# Patient Record
Sex: FEMALE | Race: WHITE | NOT HISPANIC OR LATINO | Employment: OTHER | URBAN - METROPOLITAN AREA
[De-identification: names, ages, dates, MRNs, and addresses within clinical notes are randomized per-mention and may not be internally consistent; named-entity substitution may affect disease eponyms.]

---

## 2018-05-25 ENCOUNTER — TELEPHONE (OUTPATIENT)
Dept: NEUROSURGERY | Facility: CLINIC | Age: 83
End: 2018-05-25

## 2018-05-25 NOTE — TELEPHONE ENCOUNTER
----- Message from Oscar Marlow sent at 5/25/2018 10:45 AM CDT -----  Contact: Radha ( daughter ) @ [phone]  Caller is requesting a return call about the 5-28th appt, she has questions about surgery dates

## 2018-05-25 NOTE — TELEPHONE ENCOUNTER
FANY PICKARD, PTS DAUGHTER, REVIEWED AND ANSWERED QUESTIONS ABOUT MONDAYS APPT. WILL TALK Monday AM IF THE WEATHER IS TERRIBLY BAD, WE WILL RSL FOR ANTHER DAY, PROBABLY Thursday.

## 2018-05-28 ENCOUNTER — TELEPHONE (OUTPATIENT)
Dept: NEUROSURGERY | Facility: CLINIC | Age: 83
End: 2018-05-28

## 2018-05-28 ENCOUNTER — OFFICE VISIT (OUTPATIENT)
Dept: NEUROSURGERY | Facility: CLINIC | Age: 83
End: 2018-05-28
Payer: MEDICARE

## 2018-05-28 VITALS
SYSTOLIC BLOOD PRESSURE: 136 MMHG | DIASTOLIC BLOOD PRESSURE: 84 MMHG | TEMPERATURE: 98 F | WEIGHT: 153.69 LBS | HEART RATE: 75 BPM

## 2018-05-28 DIAGNOSIS — D35.2 PITUITARY ADENOMA: Primary | ICD-10-CM

## 2018-05-28 DIAGNOSIS — D49.7 PITUITARY TUMOR: Primary | ICD-10-CM

## 2018-05-28 DIAGNOSIS — G93.89 OTHER SPECIFIED DISORDERS OF BRAIN: ICD-10-CM

## 2018-05-28 PROCEDURE — 99999 PR PBB SHADOW E&M-EST. PATIENT-LVL III: CPT | Mod: PBBFAC,,, | Performed by: NEUROLOGICAL SURGERY

## 2018-05-28 PROCEDURE — 99213 OFFICE O/P EST LOW 20 MIN: CPT | Mod: PBBFAC | Performed by: NEUROLOGICAL SURGERY

## 2018-05-28 PROCEDURE — 99204 OFFICE O/P NEW MOD 45 MIN: CPT | Mod: S$PBB,,, | Performed by: NEUROLOGICAL SURGERY

## 2018-05-28 RX ORDER — OMEPRAZOLE 20 MG/1
20 CAPSULE, DELAYED RELEASE ORAL DAILY
COMMUNITY
Start: 2018-03-29

## 2018-05-28 RX ORDER — CYANOCOBALAMIN (VITAMIN B-12) 2500 MCG
5000 TABLET, SUBLINGUAL SUBLINGUAL DAILY
Status: ON HOLD | COMMUNITY
End: 2018-06-15

## 2018-05-28 RX ORDER — LISINOPRIL 20 MG/1
20 TABLET ORAL DAILY
COMMUNITY
Start: 2018-03-06 | End: 2018-09-10

## 2018-05-28 RX ORDER — AMLODIPINE BESYLATE 5 MG/1
5 TABLET ORAL NIGHTLY
Status: ON HOLD | COMMUNITY
Start: 2018-03-06 | End: 2018-06-15 | Stop reason: HOSPADM

## 2018-05-28 RX ORDER — DULOXETIN HYDROCHLORIDE 30 MG/1
30 CAPSULE, DELAYED RELEASE ORAL DAILY
COMMUNITY
End: 2018-09-10

## 2018-05-28 NOTE — PROGRESS NOTES
This office note has been dictated.  Florence Everett was seen in neurosurgical consultation at the office this   morning.  She is an 83-year-old lady who has complained of tunnel vision and   decreased vision in the left eye for at least the past year.  She apparently had   an attack of herpes zoster (shingles) a few years ago and feels that the vision   has never been as good in that eye.  In March, she was carrying packages and   fell forward striking her face.  She was not unconscious, but suffered fairly   extensive facial injuries.  A CT scan of the brain was done at that time showing   a pituitary tumor.  MRI was then done showing a large pituitary tumor with   considerable suprasellar extension.  She was seen in neurosurgical consultation   at [facility] in [place] and also in Radiation Oncology for   consideration of stereotactic radiosurgery.  She wished to have an additional   neurosurgical opinion and was seen here today.  Headaches are not her primary   complaint.  She does require a hearing aid.  She has noted no specific   difficulty with speech or swallowing.  She has had no weakness or numbness in   the extremities.  Her balance difficulties seem to relate more to her visual   loss.  Past medical history includes hypertension for which she takes Norvasc.    She is generally healthy.  Her  is quite ill with congestive heart   failure and much of her time is spent caring for him.  She is with her children   today.    On physical examination, she is a well-developed, well-nourished white lady who   is alert and cooperative.  Examination of the head shows no tenderness over the   scalp.  Eyes show full extraocular movements.  Pupils are small, equal and   reactive to light.  Fundi were seen with some difficulty.  There seems to be   left optic pallor.  She shows bitemporal hemianopsia and vision is decreased to   barely finger counting in the left eye.  She has a hearing aid.  The neck is    supple.  On neurological examination, speech is clear.  She answers questions   appropriately.  She is appropriately concerned about her visual loss and   impairment.  Finger-to-nose shows mild tremor bilaterally.  Gait was done   reasonably well.  Cranial nerve examination is otherwise intact.  She has normal   facial sensation and movement.  The tongue protrudes in the midline.  She shows   good strength in the extremities, normal sensation and symmetrical deep tendon   reflexes.    MRI of the brain was done at [facility] in [place] on   04/05/18.  There is a large lobulated pituitary tumor, which extends on to the   planum sphenoidale and up to the third ventricle.  The right optic nerve is   displaced laterally.  The left optic nerve is not well seen. The tumor was   measured as greater than 3 cm.    IMPRESSION:  Large pituitary adenoma.    RECOMMENDATIONS:  The tumor is severely compressing the optic nerves and chiasm.    I do not believe radiation therapy would be beneficial in this setting. With   the shape and size of the tumor,  I believe this would be better approached   through a left frontotemporal craniotomy giving direct visualization of the   optic nerves and vessels.  I will plan to admit her to the hospital next week to   get this done.      CHEIKH/DEMOND  dd: 05/28/2018 13:25:22 (CDT)  td: 05/29/2018 06:27:04 (CDT)  Doc ID   #[id number]  Job ID #[id number]    CC: Florence Everett

## 2018-06-01 ENCOUNTER — TELEPHONE (OUTPATIENT)
Dept: NEUROSURGERY | Facility: CLINIC | Age: 83
End: 2018-06-01

## 2018-06-01 NOTE — TELEPHONE ENCOUNTER
----- Message from Jeanie Escamilla sent at 6/1/2018  9:35 AM CDT -----  Contact: duarte Patel [phone]  daughter Radha Patel  is  would like to be called back from SHALONDA Veliz about mother surgery      can be reached at [phone]

## 2018-06-01 NOTE — TELEPHONE ENCOUNTER
----- Message from Femi Correia sent at 6/1/2018  4:29 PM CDT -----  Contact: Pt Daughter Radha  Pt Daughter Radha is requesting to call back.    Radha can be reached on her cell [phone] or home [phone].    Thank you!

## 2018-06-04 NOTE — PRE-PROCEDURE INSTRUCTIONS
Preop instructions: NPO after midnight, shower instructions, directions, leave all valuables at home, medication instructions for PM prior & am of procedure explained. Patient stated an understanding.     Patient denies any side effects or issues with anesthesia or sedation.

## 2018-06-05 ENCOUNTER — ANESTHESIA EVENT (OUTPATIENT)
Dept: SURGERY | Facility: HOSPITAL | Age: 83
DRG: 614 | End: 2018-06-05
Payer: MEDICARE

## 2018-06-05 ENCOUNTER — HOSPITAL ENCOUNTER (OUTPATIENT)
Dept: RADIOLOGY | Facility: HOSPITAL | Age: 83
Discharge: HOME | DRG: 614 | End: 2018-06-05
Attending: NEUROLOGICAL SURGERY
Payer: MEDICARE

## 2018-06-05 DIAGNOSIS — D35.2 PITUITARY ADENOMA: ICD-10-CM

## 2018-06-05 LAB
CREAT SERPL-MCNC: 0.7 MG/DL (ref 0.5–1.4)
SAMPLE: NORMAL

## 2018-06-05 PROCEDURE — 70553 MRI BRAIN STEM W/O & W/DYE: CPT | Mod: TC

## 2018-06-05 PROCEDURE — 25500020 PHARM REV CODE 255: Performed by: NEUROLOGICAL SURGERY

## 2018-06-05 PROCEDURE — 70553 MRI BRAIN STEM W/O & W/DYE: CPT | Mod: 26,,, | Performed by: RADIOLOGY

## 2018-06-05 PROCEDURE — A9585 GADOBUTROL INJECTION: HCPCS | Performed by: NEUROLOGICAL SURGERY

## 2018-06-05 RX ORDER — GADOBUTROL 604.72 MG/ML
7 INJECTION INTRAVENOUS
Status: COMPLETED | OUTPATIENT
Start: 2018-06-05 | End: 2018-06-05

## 2018-06-05 RX ADMIN — GADOBUTROL 7 ML: 604.72 INJECTION INTRAVENOUS at 03:06

## 2018-06-05 NOTE — ANESTHESIA PREPROCEDURE EVALUATION
[facility]  Anesthesia Pre-Operative Evaluation         Patient Name: Florence Everett  YOB: 1935  MRN: [id number]    SUBJECTIVE:     Pre-operative evaluation for Procedure(s) (LRB):  CRANIOTOMY, USING FRAMELESS STEREOTAXY (STEALTH) (Left)     06/05/2018    Florence Everett is a 83 y.o. female w/ a significant PMHx of HTN, GERD, and pituitary tumor found after patient suffered from a fall and underwent a CT scan. MRI was then done which showed large pituitary tumor w/ extension into suprasellar region. On exam there is left optic pallor. She shows bitemporal hemianopsia and vision is decreased to barely finger counting in the left eye.    MRI of the brain was done at [facility] in [place] on 04/05/18. There is a large lobulated pituitary tumor, which extends on to the planum sphenoidale and up to the third ventricle. The right optic nerve is displaced laterally. The left optic nerve is not well seen. The tumor was measured as greater than 3 cm.    Patient now presents for the above procedure(s).      LDA: None documented.    Prev airway: None documented.    Drips: None documented.    There is no problem list on file for this patient.      Review of patient's allergies indicates:  No Known Allergies    No current facility-administered medications on file prior to encounter.      Current Outpatient Prescriptions on File Prior to Encounter   Medication Sig Dispense Refill    amLODIPine (NORVASC) 5 MG tablet Take 5 mg by mouth every evening.       biotin 5,000 mcg Subl Place 5,000 mg under the tongue once daily.       DULoxetine (CYMBALTA) 30 MG capsule Take 30 mg by mouth once daily.      lisinopril (PRINIVIL,ZESTRIL) 20 MG tablet Take 20 mg by mouth once daily.       omeprazole (PRILOSEC) 20 MG capsule Take 20 mg by mouth once daily.       aspirin (ASPIR-81 ORAL) Take 81 mg by mouth once daily.         Past Surgical History:   Procedure Laterality Date     HYSTERECTOMY         Social History     Social History    Marital status:      Spouse name: N/A    Number of children: N/A    Years of education: N/A     Occupational History    Not on file.     Social History Main Topics    Smoking status: Never Smoker    Smokeless tobacco: Never Used    Alcohol use No    Drug use: No    Sexual activity: Not Currently     Partners: Male     Other Topics Concern    Not on file     Social History Narrative    No narrative on file       OBJECTIVE:     Vital Signs Range (Last 24H):         Significant Labs:  No results found for: WBC, HGB, HCT, PLT, CHOL, TRIG, HDL, LDLDIRECT, ALT, AST, NA, K, CL, CREATININE, BUN, CO2, TSH, PSA, INR, GLUF, HGBA1C, MICROALBUR    Diagnostic Studies: No relevant studies.    EKG: No recent studies available.    2D ECHO:  No results found for this or any previous visit.       ASSESSMENT/PLAN:     Anesthesia Evaluation    I have reviewed the Patient Summary Reports.    I have reviewed the Nursing Notes.   I have reviewed the Medications.     Review of Systems  Anesthesia Hx:  No problems with previous Anesthesia  Denies Family Hx of Anesthesia complications.   Denies Personal Hx of Anesthesia complications.   Social:  Non-Smoker, No Alcohol Use    Hematology/Oncology:        Denies Current/Recent Cancer   EENT/Dental:   denies chronic allergic rhinitis   Cardiovascular:   Denies Hypertension.  Denies MI.  Denies CAD.    Denies CABG/stent.  Denies Dysrhythmias.             Pulmonary:   Denies COPD.  Denies Asthma.  Denies Recent URI.  Denies Sleep Apnea.    Renal/:   Denies Chronic Renal Disease.     Hepatic/GI:   Denies GERD. Denies Liver Disease.    Neurological:   Denies TIA. Denies CVA. Denies Seizures.  Brain Tumor Pituitary tumor w/ neurological deficits   Endocrine:   Denies Diabetes.    Psych:   Denies Psychiatric History.          Physical Exam  General:  Well nourished    Airway/Jaw/Neck:  Airway Findings: Mouth Opening: Normal  Tongue: Normal  General Airway Assessment: Adult  Mallampati: III  Improves to II with phonation.  TM Distance: Normal, at least 6 cm  Jaw/Neck Findings:  Neck ROM: Normal ROM      Dental:  Dental Findings: In tact   Chest/Lungs:  Chest/Lungs Findings: Clear to auscultation, Normal Respiratory Rate     Heart/Vascular:  Heart Findings: Rate: Normal  Rhythm: Regular Rhythm  Sounds: Normal     Abdomen:  Abdomen Findings:  Normal, Soft, Nontender     Musculoskeletal:  Musculoskeletal Findings: Normal   Skin:  Skin Findings: Normal    Mental Status:  Mental Status Findings:  Cooperative, Alert and Oriented         Anesthesia Plan  Type of Anesthesia, risks & benefits discussed:  Anesthesia Type:  general  Patient's Preference:   Intra-op Monitoring Plan: standard ASA monitors and arterial line  Intra-op Monitoring Plan Comments:   Post Op Pain Control Plan: multimodal analgesia, IV/PO Opioids PRN and per primary service following discharge from PACU  Post Op Pain Control Plan Comments:   Induction:   IV  Beta Blocker:  Patient is not currently on a Beta-Blocker (No further documentation required).       Informed Consent: Patient understands risks and agrees with Anesthesia plan.  Questions answered. Anesthesia consent signed with patient.  ASA Score: 3     Day of Surgery Review of History & Physical:    H&P update referred to the surgeon.         Ready For Surgery From Anesthesia Perspective.

## 2018-06-06 ENCOUNTER — ANESTHESIA (OUTPATIENT)
Dept: SURGERY | Facility: HOSPITAL | Age: 83
DRG: 614 | End: 2018-06-06
Payer: MEDICARE

## 2018-06-06 ENCOUNTER — HOSPITAL ENCOUNTER (INPATIENT)
Facility: HOSPITAL | Age: 83
LOS: 9 days | Discharge: HOME HEALTH | DRG: 614 | End: 2018-06-15
Attending: NEUROLOGICAL SURGERY | Admitting: NEUROLOGICAL SURGERY
Payer: MEDICARE

## 2018-06-06 DIAGNOSIS — E87.1 HYPONATREMIA: ICD-10-CM

## 2018-06-06 DIAGNOSIS — S06.5XAA SDH (SUBDURAL HEMATOMA): ICD-10-CM

## 2018-06-06 DIAGNOSIS — G93.40 ENCEPHALOPATHY ACUTE: ICD-10-CM

## 2018-06-06 DIAGNOSIS — H53.9 VISUAL DISTURBANCE: Primary | ICD-10-CM

## 2018-06-06 DIAGNOSIS — E23.6 PITUITARY MASS: ICD-10-CM

## 2018-06-06 DIAGNOSIS — G93.5 BRAIN COMPRESSION: ICD-10-CM

## 2018-06-06 DIAGNOSIS — I10 ESSENTIAL HYPERTENSION: ICD-10-CM

## 2018-06-06 DIAGNOSIS — I61.5 IVH (INTRAVENTRICULAR HEMORRHAGE): ICD-10-CM

## 2018-06-06 LAB
ABO + RH BLD: NORMAL
ANION GAP SERPL CALC-SCNC: 10 MMOL/L
APTT BLDCRRT: 21.9 SEC
BASOPHILS # BLD AUTO: 0.05 K/UL
BASOPHILS NFR BLD: 0.8 %
BLD GP AB SCN CELLS X3 SERPL QL: NORMAL
BUN SERPL-MCNC: 17 MG/DL
CALCIUM SERPL-MCNC: 9.3 MG/DL
CHLORIDE SERPL-SCNC: 105 MMOL/L
CO2 SERPL-SCNC: 24 MMOL/L
CREAT SERPL-MCNC: 0.8 MG/DL
DIFFERENTIAL METHOD: NORMAL
EOSINOPHIL # BLD AUTO: 0.1 K/UL
EOSINOPHIL NFR BLD: 2.3 %
ERYTHROCYTE [DISTWIDTH] IN BLOOD BY AUTOMATED COUNT: 14.1 %
EST. GFR  (AFRICAN AMERICAN): >60 ML/MIN/1.73 M^2
EST. GFR  (NON AFRICAN AMERICAN): >60 ML/MIN/1.73 M^2
GLUCOSE SERPL-MCNC: 118 MG/DL (ref 70–110)
GLUCOSE SERPL-MCNC: 127 MG/DL (ref 70–110)
GLUCOSE SERPL-MCNC: 98 MG/DL
HCO3 UR-SCNC: 20.9 MMOL/L (ref 24–28)
HCO3 UR-SCNC: 22 MMOL/L (ref 24–28)
HCT VFR BLD AUTO: 38.4 %
HCT VFR BLD CALC: 29 %PCV (ref 36–54)
HCT VFR BLD CALC: 31 %PCV (ref 36–54)
HGB BLD-MCNC: 12.7 G/DL
IMM GRANULOCYTES # BLD AUTO: 0.03 K/UL
IMM GRANULOCYTES NFR BLD AUTO: 0.5 %
INR PPP: 0.9
LYMPHOCYTES # BLD AUTO: 2.1 K/UL
LYMPHOCYTES NFR BLD: 33.9 %
MCH RBC QN AUTO: 30.7 PG
MCHC RBC AUTO-ENTMCNC: 33.1 G/DL
MCV RBC AUTO: 93 FL
MONOCYTES # BLD AUTO: 0.9 K/UL
MONOCYTES NFR BLD: 14.6 %
NEUTROPHILS # BLD AUTO: 2.9 K/UL
NEUTROPHILS NFR BLD: 47.9 %
NRBC BLD-RTO: 0 /100 WBC
PCO2 BLDA: 28.8 MMHG (ref 35–45)
PCO2 BLDA: 30.4 MMHG (ref 35–45)
PH SMN: 7.45 [PH] (ref 7.35–7.45)
PH SMN: 7.49 [PH] (ref 7.35–7.45)
PLATELET # BLD AUTO: 214 K/UL
PMV BLD AUTO: 11.5 FL
PO2 BLDA: 117 MMHG (ref 80–100)
PO2 BLDA: 120 MMHG (ref 80–100)
POC BE: -1 MMOL/L
POC BE: -3 MMOL/L
POC IONIZED CALCIUM: 1 MMOL/L (ref 1.06–1.42)
POC IONIZED CALCIUM: 1.05 MMOL/L (ref 1.06–1.42)
POC SATURATED O2: 99 % (ref 95–100)
POC SATURATED O2: 99 % (ref 95–100)
POC TCO2: 22 MMOL/L (ref 23–27)
POC TCO2: 23 MMOL/L (ref 23–27)
POTASSIUM BLD-SCNC: 3.4 MMOL/L (ref 3.5–5.1)
POTASSIUM BLD-SCNC: 3.6 MMOL/L (ref 3.5–5.1)
POTASSIUM SERPL-SCNC: 3.8 MMOL/L
PROTHROMBIN TIME: 9.6 SEC
RBC # BLD AUTO: 4.14 M/UL
SAMPLE: ABNORMAL
SAMPLE: ABNORMAL
SODIUM BLD-SCNC: 139 MMOL/L (ref 136–145)
SODIUM BLD-SCNC: 140 MMOL/L (ref 136–145)
SODIUM SERPL-SCNC: 139 MMOL/L
WBC # BLD AUTO: 6.04 K/UL

## 2018-06-06 PROCEDURE — 20000000 HC ICU ROOM

## 2018-06-06 PROCEDURE — 63600175 PHARM REV CODE 636 W HCPCS: Performed by: STUDENT IN AN ORGANIZED HEALTH CARE EDUCATION/TRAINING PROGRAM

## 2018-06-06 PROCEDURE — 0GB00ZZ EXCISION OF PITUITARY GLAND, OPEN APPROACH: ICD-10-PCS | Performed by: NEUROLOGICAL SURGERY

## 2018-06-06 PROCEDURE — C1751 CATH, INF, PER/CENT/MIDLINE: HCPCS | Performed by: STUDENT IN AN ORGANIZED HEALTH CARE EDUCATION/TRAINING PROGRAM

## 2018-06-06 PROCEDURE — 63600175 PHARM REV CODE 636 W HCPCS

## 2018-06-06 PROCEDURE — 36000713 HC OR TIME LEV V EA ADD 15 MIN: Performed by: NEUROLOGICAL SURGERY

## 2018-06-06 PROCEDURE — 37000009 HC ANESTHESIA EA ADD 15 MINS: Performed by: NEUROLOGICAL SURGERY

## 2018-06-06 PROCEDURE — 61510 CRNEC TREPH EXC BRN TUM STTL: CPT | Mod: GC,,, | Performed by: NEUROLOGICAL SURGERY

## 2018-06-06 PROCEDURE — 85730 THROMBOPLASTIN TIME PARTIAL: CPT

## 2018-06-06 PROCEDURE — 27200677 HC TRANSDUCER MONITOR KIT SINGLE: Performed by: STUDENT IN AN ORGANIZED HEALTH CARE EDUCATION/TRAINING PROGRAM

## 2018-06-06 PROCEDURE — 36000712 HC OR TIME LEV V 1ST 15 MIN: Performed by: NEUROLOGICAL SURGERY

## 2018-06-06 PROCEDURE — 86920 COMPATIBILITY TEST SPIN: CPT

## 2018-06-06 PROCEDURE — 63600175 PHARM REV CODE 636 W HCPCS: Performed by: NEUROLOGICAL SURGERY

## 2018-06-06 PROCEDURE — D9220A PRA ANESTHESIA: Mod: ,,, | Performed by: ANESTHESIOLOGY

## 2018-06-06 PROCEDURE — 27201423 OPTIME MED/SURG SUP & DEVICES STERILE SUPPLY: Performed by: NEUROLOGICAL SURGERY

## 2018-06-06 PROCEDURE — 27800903 OPTIME MED/SURG SUP & DEVICES OTHER IMPLANTS: Performed by: NEUROLOGICAL SURGERY

## 2018-06-06 PROCEDURE — 25000003 PHARM REV CODE 250: Performed by: STUDENT IN AN ORGANIZED HEALTH CARE EDUCATION/TRAINING PROGRAM

## 2018-06-06 PROCEDURE — 69990 MICROSURGERY ADD-ON: CPT | Mod: 59,,, | Performed by: NEUROLOGICAL SURGERY

## 2018-06-06 PROCEDURE — 71000039 HC RECOVERY, EACH ADD'L HOUR: Performed by: NEUROLOGICAL SURGERY

## 2018-06-06 PROCEDURE — 88331 PATH CONSLTJ SURG 1 BLK 1SPC: CPT | Mod: 26,,, | Performed by: PATHOLOGY

## 2018-06-06 PROCEDURE — 37000008 HC ANESTHESIA 1ST 15 MINUTES: Performed by: NEUROLOGICAL SURGERY

## 2018-06-06 PROCEDURE — 27000221 HC OXYGEN, UP TO 24 HOURS

## 2018-06-06 PROCEDURE — C1713 ANCHOR/SCREW BN/BN,TIS/BN: HCPCS | Performed by: NEUROLOGICAL SURGERY

## 2018-06-06 PROCEDURE — 71000033 HC RECOVERY, INTIAL HOUR: Performed by: NEUROLOGICAL SURGERY

## 2018-06-06 PROCEDURE — 85025 COMPLETE CBC W/AUTO DIFF WBC: CPT

## 2018-06-06 PROCEDURE — C1729 CATH, DRAINAGE: HCPCS | Performed by: NEUROLOGICAL SURGERY

## 2018-06-06 PROCEDURE — 88305 TISSUE EXAM BY PATHOLOGIST: CPT | Mod: 26,,, | Performed by: PATHOLOGY

## 2018-06-06 PROCEDURE — 86901 BLOOD TYPING SEROLOGIC RH(D): CPT

## 2018-06-06 PROCEDURE — 94761 N-INVAS EAR/PLS OXIMETRY MLT: CPT

## 2018-06-06 PROCEDURE — 25000003 PHARM REV CODE 250: Performed by: NEUROLOGICAL SURGERY

## 2018-06-06 PROCEDURE — 85610 PROTHROMBIN TIME: CPT

## 2018-06-06 PROCEDURE — 80048 BASIC METABOLIC PNL TOTAL CA: CPT

## 2018-06-06 PROCEDURE — 36620 INSERTION CATHETER ARTERY: CPT | Mod: 59,,, | Performed by: ANESTHESIOLOGY

## 2018-06-06 PROCEDURE — 61781 SCAN PROC CRANIAL INTRA: CPT | Mod: ,,, | Performed by: NEUROLOGICAL SURGERY

## 2018-06-06 PROCEDURE — 88305 TISSUE EXAM BY PATHOLOGIST: CPT | Performed by: PATHOLOGY

## 2018-06-06 PROCEDURE — 8E09XBZ COMPUTER ASSISTED PROCEDURE OF HEAD AND NECK REGION: ICD-10-PCS | Performed by: NEUROLOGICAL SURGERY

## 2018-06-06 DEVICE — IMPLANTABLE DEVICE: Type: IMPLANTABLE DEVICE | Site: CRANIAL | Status: FUNCTIONAL

## 2018-06-06 DEVICE — DURAFORM 2 X 2: Type: IMPLANTABLE DEVICE | Site: CRANIAL | Status: FUNCTIONAL

## 2018-06-06 DEVICE — PLATE BONE 2X2 HOLE SM BOX: Type: IMPLANTABLE DEVICE | Site: CRANIAL | Status: FUNCTIONAL

## 2018-06-06 RX ORDER — OXYCODONE HYDROCHLORIDE 5 MG/1
5 TABLET ORAL
Status: DISCONTINUED | OUTPATIENT
Start: 2018-06-06 | End: 2018-06-06 | Stop reason: HOSPADM

## 2018-06-06 RX ORDER — ONDANSETRON 2 MG/ML
4 INJECTION INTRAMUSCULAR; INTRAVENOUS DAILY PRN
Status: DISCONTINUED | OUTPATIENT
Start: 2018-06-06 | End: 2018-06-06 | Stop reason: HOSPADM

## 2018-06-06 RX ORDER — BACITRACIN 50000 [IU]/1
INJECTION, POWDER, FOR SOLUTION INTRAMUSCULAR
Status: DISCONTINUED | OUTPATIENT
Start: 2018-06-06 | End: 2018-06-06 | Stop reason: HOSPADM

## 2018-06-06 RX ORDER — HEPARIN SODIUM 5000 [USP'U]/ML
5000 INJECTION, SOLUTION INTRAVENOUS; SUBCUTANEOUS EVERY 8 HOURS
Status: DISCONTINUED | OUTPATIENT
Start: 2018-06-07 | End: 2018-06-07

## 2018-06-06 RX ORDER — ONDANSETRON 2 MG/ML
INJECTION INTRAMUSCULAR; INTRAVENOUS
Status: DISCONTINUED | OUTPATIENT
Start: 2018-06-06 | End: 2018-06-07

## 2018-06-06 RX ORDER — HYDRALAZINE HYDROCHLORIDE 20 MG/ML
20 INJECTION INTRAMUSCULAR; INTRAVENOUS EVERY 6 HOURS PRN
Status: DISCONTINUED | OUTPATIENT
Start: 2018-06-06 | End: 2018-06-15 | Stop reason: HOSPADM

## 2018-06-06 RX ORDER — MANNITOL 250 MG/ML
INJECTION, SOLUTION INTRAVENOUS
Status: DISCONTINUED | OUTPATIENT
Start: 2018-06-06 | End: 2018-06-07

## 2018-06-06 RX ORDER — NEOSTIGMINE METHYLSULFATE 1 MG/ML
INJECTION, SOLUTION INTRAVENOUS
Status: DISCONTINUED | OUTPATIENT
Start: 2018-06-06 | End: 2018-06-07

## 2018-06-06 RX ORDER — HYDRALAZINE HYDROCHLORIDE 20 MG/ML
INJECTION INTRAMUSCULAR; INTRAVENOUS
Status: COMPLETED
Start: 2018-06-06 | End: 2018-06-06

## 2018-06-06 RX ORDER — PROPOFOL 10 MG/ML
VIAL (ML) INTRAVENOUS
Status: DISCONTINUED | OUTPATIENT
Start: 2018-06-06 | End: 2018-06-07

## 2018-06-06 RX ORDER — ROCURONIUM BROMIDE 10 MG/ML
INJECTION, SOLUTION INTRAVENOUS
Status: DISCONTINUED | OUTPATIENT
Start: 2018-06-06 | End: 2018-06-07

## 2018-06-06 RX ORDER — AMLODIPINE BESYLATE 5 MG/1
5 TABLET ORAL NIGHTLY
Status: DISCONTINUED | OUTPATIENT
Start: 2018-06-06 | End: 2018-06-10

## 2018-06-06 RX ORDER — LIDOCAINE HCL/PF 100 MG/5ML
SYRINGE (ML) INTRAVENOUS
Status: DISCONTINUED | OUTPATIENT
Start: 2018-06-06 | End: 2018-06-07

## 2018-06-06 RX ORDER — MUPIROCIN 20 MG/G
1 OINTMENT TOPICAL
Status: DISCONTINUED | OUTPATIENT
Start: 2018-06-06 | End: 2018-06-06

## 2018-06-06 RX ORDER — LISINOPRIL 20 MG/1
20 TABLET ORAL DAILY
Status: DISCONTINUED | OUTPATIENT
Start: 2018-06-06 | End: 2018-06-15 | Stop reason: HOSPADM

## 2018-06-06 RX ORDER — LIDOCAINE HYDROCHLORIDE AND EPINEPHRINE 10; 10 MG/ML; UG/ML
INJECTION, SOLUTION INFILTRATION; PERINEURAL
Status: DISCONTINUED | OUTPATIENT
Start: 2018-06-06 | End: 2018-06-06 | Stop reason: HOSPADM

## 2018-06-06 RX ORDER — ACETAMINOPHEN 10 MG/ML
INJECTION, SOLUTION INTRAVENOUS
Status: DISCONTINUED | OUTPATIENT
Start: 2018-06-06 | End: 2018-06-07

## 2018-06-06 RX ORDER — DULOXETIN HYDROCHLORIDE 30 MG/1
30 CAPSULE, DELAYED RELEASE ORAL DAILY
Status: DISCONTINUED | OUTPATIENT
Start: 2018-06-06 | End: 2018-06-15 | Stop reason: HOSPADM

## 2018-06-06 RX ORDER — MIDAZOLAM HYDROCHLORIDE 1 MG/ML
INJECTION, SOLUTION INTRAMUSCULAR; INTRAVENOUS
Status: DISCONTINUED | OUTPATIENT
Start: 2018-06-06 | End: 2018-06-07

## 2018-06-06 RX ORDER — SODIUM CHLORIDE 9 MG/ML
INJECTION, SOLUTION INTRAVENOUS CONTINUOUS
Status: DISCONTINUED | OUTPATIENT
Start: 2018-06-06 | End: 2018-06-06

## 2018-06-06 RX ORDER — MUPIROCIN 20 MG/G
OINTMENT TOPICAL
Status: DISCONTINUED | OUTPATIENT
Start: 2018-06-06 | End: 2018-06-06

## 2018-06-06 RX ORDER — OXYCODONE AND ACETAMINOPHEN 5; 325 MG/1; MG/1
1 TABLET ORAL EVERY 4 HOURS PRN
Status: DISCONTINUED | OUTPATIENT
Start: 2018-06-06 | End: 2018-06-15 | Stop reason: HOSPADM

## 2018-06-06 RX ORDER — FENTANYL CITRATE 50 UG/ML
INJECTION, SOLUTION INTRAMUSCULAR; INTRAVENOUS
Status: DISCONTINUED | OUTPATIENT
Start: 2018-06-06 | End: 2018-06-07

## 2018-06-06 RX ORDER — KETAMINE HYDROCHLORIDE 100 MG/ML
INJECTION, SOLUTION INTRAMUSCULAR; INTRAVENOUS
Status: DISCONTINUED | OUTPATIENT
Start: 2018-06-06 | End: 2018-06-07

## 2018-06-06 RX ORDER — FENTANYL CITRATE 50 UG/ML
25 INJECTION, SOLUTION INTRAMUSCULAR; INTRAVENOUS
Status: DISCONTINUED | OUTPATIENT
Start: 2018-06-06 | End: 2018-06-10

## 2018-06-06 RX ORDER — PANTOPRAZOLE SODIUM 40 MG/1
40 TABLET, DELAYED RELEASE ORAL DAILY
Status: DISCONTINUED | OUTPATIENT
Start: 2018-06-06 | End: 2018-06-15 | Stop reason: HOSPADM

## 2018-06-06 RX ORDER — PHENYLEPHRINE HYDROCHLORIDE 10 MG/ML
INJECTION INTRAVENOUS
Status: DISCONTINUED | OUTPATIENT
Start: 2018-06-06 | End: 2018-06-07

## 2018-06-06 RX ORDER — GLYCOPYRROLATE 0.2 MG/ML
INJECTION INTRAMUSCULAR; INTRAVENOUS
Status: DISCONTINUED | OUTPATIENT
Start: 2018-06-06 | End: 2018-06-07

## 2018-06-06 RX ORDER — LIDOCAINE HYDROCHLORIDE 10 MG/ML
1 INJECTION, SOLUTION EPIDURAL; INFILTRATION; INTRACAUDAL; PERINEURAL ONCE
Status: COMPLETED | OUTPATIENT
Start: 2018-06-06 | End: 2018-06-06

## 2018-06-06 RX ORDER — SODIUM CHLORIDE 9 MG/ML
INJECTION, SOLUTION INTRAVENOUS CONTINUOUS PRN
Status: DISCONTINUED | OUTPATIENT
Start: 2018-06-06 | End: 2018-06-07

## 2018-06-06 RX ORDER — SODIUM CHLORIDE 0.9 % (FLUSH) 0.9 %
3 SYRINGE (ML) INJECTION
Status: DISCONTINUED | OUTPATIENT
Start: 2018-06-06 | End: 2018-06-06 | Stop reason: HOSPADM

## 2018-06-06 RX ORDER — LABETALOL HYDROCHLORIDE 5 MG/ML
10 INJECTION, SOLUTION INTRAVENOUS EVERY 10 MIN PRN
Status: DISCONTINUED | OUTPATIENT
Start: 2018-06-06 | End: 2018-06-15 | Stop reason: HOSPADM

## 2018-06-06 RX ADMIN — ACETAMINOPHEN 1000 MG: 10 INJECTION, SOLUTION INTRAVENOUS at 09:06

## 2018-06-06 RX ADMIN — ROCURONIUM BROMIDE 10 MG: 10 INJECTION, SOLUTION INTRAVENOUS at 09:06

## 2018-06-06 RX ADMIN — HYDROCORTISONE SODIUM SUCCINATE 100 MG: 100 INJECTION, POWDER, FOR SOLUTION INTRAMUSCULAR; INTRAVENOUS at 08:06

## 2018-06-06 RX ADMIN — HYDRALAZINE HYDROCHLORIDE 20 MG: 20 INJECTION INTRAMUSCULAR; INTRAVENOUS at 03:06

## 2018-06-06 RX ADMIN — MANNITOL 50 G: 250 INJECTION, SOLUTION INTRAVENOUS at 10:06

## 2018-06-06 RX ADMIN — FENTANYL CITRATE 50 MCG: 50 INJECTION, SOLUTION INTRAMUSCULAR; INTRAVENOUS at 12:06

## 2018-06-06 RX ADMIN — SODIUM CHLORIDE 1000 ML: 0.9 INJECTION, SOLUTION INTRAVENOUS at 07:06

## 2018-06-06 RX ADMIN — CEFTRIAXONE 2 G: 2 INJECTION, SOLUTION INTRAVENOUS at 08:06

## 2018-06-06 RX ADMIN — KETAMINE HYDROCHLORIDE 10 MG: 100 INJECTION, SOLUTION, CONCENTRATE INTRAMUSCULAR; INTRAVENOUS at 11:06

## 2018-06-06 RX ADMIN — PHENYLEPHRINE HYDROCHLORIDE 100 MCG: 10 INJECTION INTRAVENOUS at 12:06

## 2018-06-06 RX ADMIN — PANTOPRAZOLE SODIUM 40 MG: 40 TABLET, DELAYED RELEASE ORAL at 04:06

## 2018-06-06 RX ADMIN — PROPOFOL 30 MG: 10 INJECTION, EMULSION INTRAVENOUS at 08:06

## 2018-06-06 RX ADMIN — ROCURONIUM BROMIDE 40 MG: 10 INJECTION, SOLUTION INTRAVENOUS at 08:06

## 2018-06-06 RX ADMIN — SODIUM CHLORIDE: 0.9 INJECTION, SOLUTION INTRAVENOUS at 08:06

## 2018-06-06 RX ADMIN — HYDROCORTISONE SODIUM SUCCINATE 100 MG: 100 INJECTION, POWDER, FOR SOLUTION INTRAMUSCULAR; INTRAVENOUS at 11:06

## 2018-06-06 RX ADMIN — PHENYLEPHRINE HYDROCHLORIDE 100 MCG: 10 INJECTION INTRAVENOUS at 09:06

## 2018-06-06 RX ADMIN — CALCIUM CHLORIDE 250 MG: 100 INJECTION, SOLUTION INTRAVENOUS at 12:06

## 2018-06-06 RX ADMIN — PHENYLEPHRINE HYDROCHLORIDE 100 MCG: 10 INJECTION INTRAVENOUS at 10:06

## 2018-06-06 RX ADMIN — KETAMINE HYDROCHLORIDE 10 MG: 100 INJECTION, SOLUTION, CONCENTRATE INTRAMUSCULAR; INTRAVENOUS at 12:06

## 2018-06-06 RX ADMIN — AMLODIPINE BESYLATE 5 MG: 5 TABLET ORAL at 11:06

## 2018-06-06 RX ADMIN — PROPOFOL 150 MG: 10 INJECTION, EMULSION INTRAVENOUS at 08:06

## 2018-06-06 RX ADMIN — LIDOCAINE HYDROCHLORIDE 80 MG: 20 INJECTION, SOLUTION INTRAVENOUS at 08:06

## 2018-06-06 RX ADMIN — MUPIROCIN: 20 OINTMENT TOPICAL at 07:06

## 2018-06-06 RX ADMIN — SODIUM CHLORIDE, SODIUM GLUCONATE, SODIUM ACETATE, POTASSIUM CHLORIDE, MAGNESIUM CHLORIDE, SODIUM PHOSPHATE, DIBASIC, AND POTASSIUM PHOSPHATE: .53; .5; .37; .037; .03; .012; .00082 INJECTION, SOLUTION INTRAVENOUS at 10:06

## 2018-06-06 RX ADMIN — SODIUM CHLORIDE 0.15 MCG/KG/MIN: 9 INJECTION, SOLUTION INTRAVENOUS at 10:06

## 2018-06-06 RX ADMIN — MIDAZOLAM HYDROCHLORIDE 1 MG: 1 INJECTION, SOLUTION INTRAMUSCULAR; INTRAVENOUS at 08:06

## 2018-06-06 RX ADMIN — FENTANYL CITRATE 50 MCG: 50 INJECTION, SOLUTION INTRAMUSCULAR; INTRAVENOUS at 08:06

## 2018-06-06 RX ADMIN — OXYCODONE HYDROCHLORIDE AND ACETAMINOPHEN 1 TABLET: 5; 325 TABLET ORAL at 04:06

## 2018-06-06 RX ADMIN — ROCURONIUM BROMIDE 20 MG: 10 INJECTION, SOLUTION INTRAVENOUS at 12:06

## 2018-06-06 RX ADMIN — ROCURONIUM BROMIDE 10 MG: 10 INJECTION, SOLUTION INTRAVENOUS at 11:06

## 2018-06-06 RX ADMIN — GLYCOPYRROLATE 0.6 MG: 0.2 INJECTION, SOLUTION INTRAMUSCULAR; INTRAVENOUS at 01:06

## 2018-06-06 RX ADMIN — SODIUM CHLORIDE, SODIUM GLUCONATE, SODIUM ACETATE, POTASSIUM CHLORIDE, MAGNESIUM CHLORIDE, SODIUM PHOSPHATE, DIBASIC, AND POTASSIUM PHOSPHATE: .53; .5; .37; .037; .03; .012; .00082 INJECTION, SOLUTION INTRAVENOUS at 08:06

## 2018-06-06 RX ADMIN — PHENYLEPHRINE HYDROCHLORIDE 100 MCG: 10 INJECTION INTRAVENOUS at 11:06

## 2018-06-06 RX ADMIN — ONDANSETRON 4 MG: 2 INJECTION INTRAMUSCULAR; INTRAVENOUS at 01:06

## 2018-06-06 RX ADMIN — KETAMINE HYDROCHLORIDE 20 MG: 100 INJECTION, SOLUTION, CONCENTRATE INTRAMUSCULAR; INTRAVENOUS at 08:06

## 2018-06-06 RX ADMIN — LIDOCAINE HYDROCHLORIDE 0.2 MG: 10 INJECTION, SOLUTION EPIDURAL; INFILTRATION; INTRACAUDAL; PERINEURAL at 07:06

## 2018-06-06 RX ADMIN — EPHEDRINE SULFATE 10 MG: 50 INJECTION INTRAMUSCULAR; INTRAVENOUS; SUBCUTANEOUS at 09:06

## 2018-06-06 RX ADMIN — NEOSTIGMINE METHYLSULFATE 5 MG: 1 INJECTION INTRAVENOUS at 01:06

## 2018-06-06 RX ADMIN — ROCURONIUM BROMIDE 10 MG: 10 INJECTION, SOLUTION INTRAVENOUS at 10:06

## 2018-06-06 RX ADMIN — LISINOPRIL 20 MG: 20 TABLET ORAL at 04:06

## 2018-06-06 RX ADMIN — SODIUM CHLORIDE, SODIUM GLUCONATE, SODIUM ACETATE, POTASSIUM CHLORIDE, MAGNESIUM CHLORIDE, SODIUM PHOSPHATE, DIBASIC, AND POTASSIUM PHOSPHATE: .53; .5; .37; .037; .03; .012; .00082 INJECTION, SOLUTION INTRAVENOUS at 09:06

## 2018-06-06 NOTE — HPI
Patient is a 83 year old Female of HTN who is s/p left frontotemporal craniotomy with excision of pituitary tumor with neuronavigation and microsurgery. Patient presented to NSGY clinic 5/29/18 endorsing decreased vision in her left eye for the past year. In March, she suffered a fall without LOC and had extensive facial injuries. A CT of the brain was performed that showed a pituitary tumor. MRI brain then was performed that showed a large pituitary tumor with considerable suprasellar extension. On her physical exam at the time of clinic visit, she was noted to have full ocular movements, bitemporal hemianopsia and vision decreased in her left eye to barely counting. Decision was made for excision of the adenoma.   Patient is s/p excision of tumor 6/6/18. She will be admitted to [facility] for higher level of care.

## 2018-06-06 NOTE — PROGRESS NOTES
Notified Dr. Garrison with Neuro Critical Care (NCC) of patients arrival in PACU slot 2. Notified of current vitals (see flowsheet). Patient still slightly sedated and unable to participate in full neuro exam. Will continue to monitor and reassess.

## 2018-06-06 NOTE — H&P
[facility]  Neurocritical Care  History & Physical    Admit Date: 6/6/2018  Service Date: 06/06/2018  Length of Stay: 0    Subjective:     Chief Complaint: Pituitary mass    History of Present Illness: Patient is a 83 year old Female of HTN who is s/p left frontotemporal craniotomy with excision of pituitary tumor with neuronavigation and microsurgery. Patient presented to NS clinic 5/29/18 endorsing decreased vision in her left eye for the past year. In March, she suffered a fall without LOC and had extensive facial injuries. A CT of the brain was performed that showed a pituitary tumor. MRI brain then was performed that showed a large pituitary tumor with considerable suprasellar extension. On her physical exam at the time of clinic visit, she was noted to have full ocular movements, bitemporal hemianopsia and vision decreased in her left eye to barely counting. Decision was made for excision of the adenoma.   Patient is s/p excision of tumor 6/6/18. She will be admitted to [facility] for higher level of care.     Past Medical History:   Diagnosis Date    Cataract     Hyperlipidemia     Hypertension     Neuromuscular disorder      Past Surgical History:   Procedure Laterality Date    HYSTERECTOMY        No current facility-administered medications on file prior to encounter.      Current Outpatient Prescriptions on File Prior to Encounter   Medication Sig Dispense Refill    amLODIPine (NORVASC) 5 MG tablet Take 5 mg by mouth every evening.       aspirin (ASPIR-81 ORAL) Take 81 mg by mouth once daily.      biotin 5,000 mcg Subl Place 5,000 mg under the tongue once daily.       DULoxetine (CYMBALTA) 30 MG capsule Take 30 mg by mouth once daily.      lisinopril (PRINIVIL,ZESTRIL) 20 MG tablet Take 20 mg by mouth once daily.       omeprazole (PRILOSEC) 20 MG capsule Take 20 mg by mouth once daily.         Allergies: Patient has no known allergies.    History reviewed. No pertinent family  history.  Social History   Substance Use Topics    Smoking status: Never Smoker    Smokeless tobacco: Never Used    Alcohol use No     Review of Systems   Unable to perform ROS: Other   Unable to perform 2/2 patient's level of cooperation    Objective:     Vitals:    Temp: 97.7 °F (36.5 °C)  Pulse: 103  Rhythm: normal sinus rhythm  BP: 116/62  MAP (mmHg): 82  Resp: 19  SpO2: (!) 93 %  O2 Device (Oxygen Therapy): room air    Temp  Min: 97.2 °F (36.2 °C)  Max: 98 °F (36.7 °C)  Pulse  Min: 62  Max: 103  BP  Min: 109/60  Max: 183/92  MAP (mmHg)  Min: 77  Max: 130  Resp  Min: 14  Max: 21  SpO2  Min: 93 %  Max: 100 %    No intake/output data recorded.           Physical Exam   Constitutional: She appears well-developed and well-nourished.   HENT:   Drain in place   Cardiovascular: Normal rate.    Pulmonary/Chest: Effort normal.   Skin: Skin is warm and dry.   Nursing note and vitals reviewed.  Mental Status: Lethargic; opens eyes to verbal and tactile stimulation; not following commands  No gross facial asymmetry noted  Motor: Extremity strength difficult to fully assess secondary to mental status.  Patient withdrawing in BLEs to noxious stimuli  Coordination and gait unable to assess sec to mental status    Unable to test vision, orientation, memory, language, coordination and gait  due to level of consciousness.    Today I personally reviewed pertinent medications, imaging, lab results, notably:        Assessment/Plan:     Cardiac/Vascular   Essential hypertension    -SBP < 160  -Continue home meds of Lisinopril 20 mg and Amlodipine 5 mg daily         Endocrine   * Pituitary mass    -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  -Patient s/p left frontotemporal craniotomy, excision of pituitary tumor with neuronavigation and microsurgery  -Admitted to [facility]   -NSGY following   -Repeat MRI brain ordered per NSGY  -CT head ordered for 0400 6/7/18  -Will follow exam  -SBP <160                Prophylaxis:  Venous Thromboembolism: chemical  Stress Ulcer: PPI  Ventilator Pneumonia: not applicable     Activity Orders          Up with assistance starting at 06/07 0600        No Order    Vonda Ramírez MD  Neurocritical Care  [facility]

## 2018-06-06 NOTE — SUBJECTIVE & OBJECTIVE
Past Medical History:   Diagnosis Date    Cataract     Hyperlipidemia     Hypertension     Neuromuscular disorder      Past Surgical History:   Procedure Laterality Date    HYSTERECTOMY        No current facility-administered medications on file prior to encounter.      Current Outpatient Prescriptions on File Prior to Encounter   Medication Sig Dispense Refill    amLODIPine (NORVASC) 5 MG tablet Take 5 mg by mouth every evening.       aspirin (ASPIR-81 ORAL) Take 81 mg by mouth once daily.      biotin 5,000 mcg Subl Place 5,000 mg under the tongue once daily.       DULoxetine (CYMBALTA) 30 MG capsule Take 30 mg by mouth once daily.      lisinopril (PRINIVIL,ZESTRIL) 20 MG tablet Take 20 mg by mouth once daily.       omeprazole (PRILOSEC) 20 MG capsule Take 20 mg by mouth once daily.         Allergies: Patient has no known allergies.    History reviewed. No pertinent family history.  Social History   Substance Use Topics    Smoking status: Never Smoker    Smokeless tobacco: Never Used    Alcohol use No     Review of Systems   Unable to perform ROS: Other   Unable to perform 2/2 patient's level of cooperation    Objective:     Vitals:    Temp: 97.7 °F (36.5 °C)  Pulse: 103  Rhythm: normal sinus rhythm  BP: 116/62  MAP (mmHg): 82  Resp: 19  SpO2: (!) 93 %  O2 Device (Oxygen Therapy): room air    Temp  Min: 97.2 °F (36.2 °C)  Max: 98 °F (36.7 °C)  Pulse  Min: 62  Max: 103  BP  Min: 109/60  Max: 183/92  MAP (mmHg)  Min: 77  Max: 130  Resp  Min: 14  Max: 21  SpO2  Min: 93 %  Max: 100 %    No intake/output data recorded.           Physical Exam   Constitutional: She appears well-developed and well-nourished.   HENT:   Drain in place   Cardiovascular: Normal rate.    Pulmonary/Chest: Effort normal.   Skin: Skin is warm and dry.   Nursing note and vitals reviewed.  Mental Status: Lethargic; opens eyes to verbal and tactile stimulation; not following commands  No gross facial asymmetry noted  Motor: Extremity  strength difficult to fully assess secondary to mental status.  Patient withdrawing in BLEs to noxious stimuli  Coordination and gait unable to assess sec to mental status    Unable to test vision, orientation, memory, language, coordination and gait  due to level of consciousness.    Today I personally reviewed pertinent medications, imaging, lab results, notably:

## 2018-06-06 NOTE — HOSPITAL COURSE
6/6: s/p left frontotemporal craniotomy with excision of pituitary tumor with neuronavigation and microsurgery; admitted to [facility]   6/7: Follow up CT head with blood in ventricles; Follow up scan scheduled today  6/8: Patient more awake and alert than prior   6/10: SG drain pulled in AM. Vomited in afternoon, CTH stable   6/12: encephalopathic this am, sodium drop to 132mmHg, no obvious development of excessive diuresis           Urine studies pending, fluid restrict, EEG x 24 hrs  6/13: Exam improved. Na improved with fluid restriction. Pending step down to NSGY service. DVT ppx started,

## 2018-06-06 NOTE — H&P
[facility]  Neurosurgery  History & Physical    Patient Name: Florence Everett  MRN: [id number]  Admission Date: 6/6/2018  Attending Physician: Keo Shaffer MD   Primary Care Provider: Doc Moore MD    Patient information was obtained from patient and past medical records.     Subjective:     Chief Complaint/Reason for Admission: Surgery    History of Present Illness: Florence Everett is a 83 y.o. female w/ a significant PMHx of HTN, GERD, and pituitary tumor found after patient suffered from a fall and underwent a CT scan. MRI was then done which showed large pituitary tumor w/ extension into suprasellar region. On exam there is left optic pallor. She shows bitemporal hemianopsia and vision is decreased to barely finger counting in the left eye.     MRI of the brain was done at [facility] in [place] on 04/05/18. There is a large lobulated pituitary tumor, which extends on to the planum sphenoidale and up to the third ventricle. The right optic nerve is displaced laterally. The left optic nerve is not well seen. The tumor was measured as greater than 3 cm.     Patient now presents for surgery. No new complaints.    PTA Medications   Medication Sig    amLODIPine (NORVASC) 5 MG tablet Take 5 mg by mouth every evening.     aspirin (ASPIR-81 ORAL) Take 81 mg by mouth once daily.    biotin 5,000 mcg Subl Place 5,000 mg under the tongue once daily.     DULoxetine (CYMBALTA) 30 MG capsule Take 30 mg by mouth once daily.    lisinopril (PRINIVIL,ZESTRIL) 20 MG tablet Take 20 mg by mouth once daily.     omeprazole (PRILOSEC) 20 MG capsule Take 20 mg by mouth once daily.        Review of patient's allergies indicates:  No Known Allergies    Past Medical History:   Diagnosis Date    Cataract     Hyperlipidemia     Hypertension     Neuromuscular disorder      Past Surgical History:   Procedure Laterality Date    HYSTERECTOMY       Family History     None        Social  History Main Topics    Smoking status: Never Smoker    Smokeless tobacco: Never Used    Alcohol use No    Drug use: No    Sexual activity: Not Currently     Partners: Male     Review of Systems  Objective:     Weight: 66.7 kg (147 lb)  Body mass index is 26.89 kg/m².  Vital Signs (Most Recent):  Temp: 98 °F (36.7 °C) (06/06/18 0640)  Pulse: 80 (06/06/18 0640)  Resp: 14 (06/06/18 0640)  BP: (!) 158/82 (06/06/18 0640)  SpO2: 98 % (06/06/18 0640) Vital Signs (24h Range):  Temp:  [98 °F (36.7 °C)] 98 °F (36.7 °C)  Pulse:  [80] 80  Resp:  [14] 14  SpO2:  [98 %] 98 %  BP: (158)/(82) 158/82       Neurosurgery Physical Exam    General: well developed, well nourished, no distress.   Head: normocephalic, atraumatic  Neurologic: Alert and oriented. Thought content appropriate.  GCS: Motor: 6/Verbal: 5/Eyes: 4 GCS Total: 15  Mental Status: Awake, Alert, Oriented x 4  Language: No aphasia  Speech: No dysarthria  Cranial nerves: face symmetric, tongue midline, CN II-XII grossly intact.   Eyes: pupils equal, round, reactive to light with accomodation, EOMI.  Pulmonary: normal respirations, no signs of respiratory distress  Abdomen: soft, non-distended  Sensory: intact to light touch throughout  Motor Strength: Moves all extremities spontaneously with good tone.  Full strength upper and lower extremities. No abnormal movements seen.     Strength  Deltoids Triceps Biceps Wrist Extension Wrist Flexion Hand    Upper: R 5/5 5/5 5/5 5/5 5/5 5/5    L 5/5 5/5 5/5 5/5 5/5 5/5     Iliopsoas Quadriceps Knee  Flexion Tibialis  anterior Gastro- cnemius EHL   Lower: R 5/5 5/5 5/5 5/5 5/5 5/5    L 5/5 5/5 5/5 5/5 5/5 5/5     DTR's - 2 + and symmetric in UE and LE  Pronator Drift: no drift noted  Finger-to-nose: Intact bilaterally  Pulses: 2+ and symmetric radial and dorsalis pedis.    Significant Labs:  No results for input(s): GLU, NA, K, CL, CO2, BUN, CREATININE, CALCIUM, MG in the last 48 hours.  No results for input(s): WBC, HGB,  HCT, PLT in the last 48 hours.  No results for input(s): LABPT, INR, APTT in the last 48 hours.  Microbiology Results (last 7 days)     ** No results found for the last 168 hours. **        All pertinent labs from the last 24 hours have been reviewed.    Significant Diagnostics:  Reviewed    Assessment/Plan:     Active Diagnoses:    Diagnosis Date Noted POA    Pituitary mass [E23.7] 06/06/2018 Yes      Problems Resolved During this Admission:    Diagnosis Date Noted Date Resolved POA     -- To OR for surgery planned.     Álvaro Crespo MD  Neurosurgery  [facility]

## 2018-06-06 NOTE — ANESTHESIA PROCEDURE NOTES
Arterial    Diagnosis: Pituitary tumor  Doctor requesting consult: Edmund    Patient location during procedure: done in OR  Procedure start time: 6/6/2018 8:46 AM  Timeout: 6/6/2018 8:45 AM  Procedure end time: 6/6/2018 8:50 AM  Staffing  Anesthesiologist: ANALY ROGERS  Resident/CRNA: QUANG PEARCE  Performed: resident/CRNA   Anesthesiologist was present at the time of the procedure.  Preanesthetic Checklist  Completed: patient identified, surgical consent, pre-op evaluation, timeout performed, IV checked, risks and benefits discussed, monitors and equipment checked and anesthesia consent givenArterial  Skin Prep: chlorhexidine gluconate  Local Infiltration: lidocaine  Orientation: right  Location: radial  Catheter Size: 20 G  Catheter placement by Anatomical landmarks. Heme positive aspiration all ports.Insertion Attempts: 1  Assessment  Dressing: secured with tape and tegaderm  Patient: Tolerated well

## 2018-06-06 NOTE — ASSESSMENT & PLAN NOTE
-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  -Patient s/p left frontotemporal craniotomy, excision of pituitary tumor with neuronavigation and microsurgery  -Admitted to [facility]   -NSGY following   -Repeat MRI brain ordered per NSGY  -CT head ordered for 0400 6/7/18  -Will follow exam  -SBP <160

## 2018-06-06 NOTE — BRIEF OP NOTE
[facility]  Brief Operative Note    SUMMARY     Surgery Date: 6/6/2018     Surgeon(s) and Role:     * Keo Shaffer MD - Primary    Assisting Surgeon: REBEKAH Crespo MD Resident in neurosurgery    Pre-op Diagnosis:  Pituitary tumor [D49.7]    Post-op Diagnosis:  Post-Op Diagnosis Codes:     * Pituitary tumor [D49.7]    Procedure:  Left frontotemporal craniotomy, excision of pituitary tumor with neuronavigation and microsurgery    Anesthesia: General    Description of Procedure: Pterional transsylvian resection of pituitary adenoma.    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  Good decompression of optic nerves.      Estimated Blood Loss: *300 ml.         Specimens:   Specimen (12h ago through future)    Start     Ordered    06/06/18 1255  Specimen to Pathology - Surgery  Once     Comments:  1) Brain tumor--FROZEN---SENT2) Brain Tumor---PERM      06/06/18 7048

## 2018-06-07 LAB
ANION GAP SERPL CALC-SCNC: 7 MMOL/L
BACTERIA #/AREA URNS AUTO: ABNORMAL /HPF
BASOPHILS # BLD AUTO: 0.01 K/UL
BASOPHILS NFR BLD: 0.1 %
BILIRUB UR QL STRIP: NEGATIVE
BUN SERPL-MCNC: 9 MG/DL
CALCIUM SERPL-MCNC: 8.2 MG/DL
CHLORIDE SERPL-SCNC: 108 MMOL/L
CLARITY UR REFRACT.AUTO: CLEAR
CO2 SERPL-SCNC: 25 MMOL/L
COLOR UR AUTO: YELLOW
CREAT SERPL-MCNC: 0.7 MG/DL
DIFFERENTIAL METHOD: ABNORMAL
EOSINOPHIL # BLD AUTO: 0 K/UL
EOSINOPHIL NFR BLD: 0 %
ERYTHROCYTE [DISTWIDTH] IN BLOOD BY AUTOMATED COUNT: 14.5 %
EST. GFR  (AFRICAN AMERICAN): >60 ML/MIN/1.73 M^2
EST. GFR  (NON AFRICAN AMERICAN): >60 ML/MIN/1.73 M^2
GLUCOSE SERPL-MCNC: 162 MG/DL
GLUCOSE UR QL STRIP: NEGATIVE
HCT VFR BLD AUTO: 34.3 %
HGB BLD-MCNC: 11 G/DL
HGB UR QL STRIP: ABNORMAL
IMM GRANULOCYTES # BLD AUTO: 0.06 K/UL
IMM GRANULOCYTES NFR BLD AUTO: 0.4 %
KETONES UR QL STRIP: ABNORMAL
LEUKOCYTE ESTERASE UR QL STRIP: NEGATIVE
LYMPHOCYTES # BLD AUTO: 1 K/UL
LYMPHOCYTES NFR BLD: 7.3 %
MAGNESIUM SERPL-MCNC: 2 MG/DL
MCH RBC QN AUTO: 29.9 PG
MCHC RBC AUTO-ENTMCNC: 32.1 G/DL
MCV RBC AUTO: 93 FL
MICROSCOPIC COMMENT: ABNORMAL
MONOCYTES # BLD AUTO: 1 K/UL
MONOCYTES NFR BLD: 7 %
NEUTROPHILS # BLD AUTO: 11.5 K/UL
NEUTROPHILS NFR BLD: 85.2 %
NITRITE UR QL STRIP: NEGATIVE
NRBC BLD-RTO: 0 /100 WBC
PH UR STRIP: 5 [PH] (ref 5–8)
PHOSPHATE SERPL-MCNC: 2.7 MG/DL
PLATELET # BLD AUTO: 200 K/UL
PMV BLD AUTO: 11.1 FL
POTASSIUM SERPL-SCNC: 3.5 MMOL/L
PROT UR QL STRIP: NEGATIVE
RBC # BLD AUTO: 3.68 M/UL
RBC #/AREA URNS AUTO: 11 /HPF (ref 0–4)
SODIUM SERPL-SCNC: 140 MMOL/L
SP GR UR STRIP: 1.02 (ref 1–1.03)
SQUAMOUS #/AREA URNS AUTO: 1 /HPF
URN SPEC COLLECT METH UR: ABNORMAL
UROBILINOGEN UR STRIP-ACNC: NEGATIVE EU/DL
WBC # BLD AUTO: 13.51 K/UL
WBC #/AREA URNS AUTO: 2 /HPF (ref 0–5)

## 2018-06-07 PROCEDURE — 85025 COMPLETE CBC W/AUTO DIFF WBC: CPT

## 2018-06-07 PROCEDURE — 84100 ASSAY OF PHOSPHORUS: CPT

## 2018-06-07 PROCEDURE — 63600175 PHARM REV CODE 636 W HCPCS: Performed by: STUDENT IN AN ORGANIZED HEALTH CARE EDUCATION/TRAINING PROGRAM

## 2018-06-07 PROCEDURE — 81001 URINALYSIS AUTO W/SCOPE: CPT

## 2018-06-07 PROCEDURE — 27000221 HC OXYGEN, UP TO 24 HOURS

## 2018-06-07 PROCEDURE — 99233 SBSQ HOSP IP/OBS HIGH 50: CPT | Mod: ,,, | Performed by: PSYCHIATRY & NEUROLOGY

## 2018-06-07 PROCEDURE — 25000003 PHARM REV CODE 250: Performed by: HOSPITALIST

## 2018-06-07 PROCEDURE — 20000000 HC ICU ROOM

## 2018-06-07 PROCEDURE — 25000003 PHARM REV CODE 250: Performed by: STUDENT IN AN ORGANIZED HEALTH CARE EDUCATION/TRAINING PROGRAM

## 2018-06-07 PROCEDURE — 80048 BASIC METABOLIC PNL TOTAL CA: CPT

## 2018-06-07 PROCEDURE — 83735 ASSAY OF MAGNESIUM: CPT

## 2018-06-07 RX ORDER — SODIUM CHLORIDE 9 MG/ML
INJECTION, SOLUTION INTRAVENOUS CONTINUOUS
Status: DISCONTINUED | OUTPATIENT
Start: 2018-06-07 | End: 2018-06-10

## 2018-06-07 RX ORDER — CEFAZOLIN SODIUM 1 G/3ML
1 INJECTION, POWDER, FOR SOLUTION INTRAMUSCULAR; INTRAVENOUS
Status: COMPLETED | OUTPATIENT
Start: 2018-06-07 | End: 2018-06-08

## 2018-06-07 RX ADMIN — PANTOPRAZOLE SODIUM 40 MG: 40 TABLET, DELAYED RELEASE ORAL at 08:06

## 2018-06-07 RX ADMIN — SODIUM CHLORIDE: 0.9 INJECTION, SOLUTION INTRAVENOUS at 11:06

## 2018-06-07 RX ADMIN — HYDROCORTISONE SODIUM SUCCINATE 50 MG: 100 INJECTION, POWDER, FOR SOLUTION INTRAMUSCULAR; INTRAVENOUS at 08:06

## 2018-06-07 RX ADMIN — HYDROCORTISONE SODIUM SUCCINATE 100 MG: 100 INJECTION, POWDER, FOR SOLUTION INTRAMUSCULAR; INTRAVENOUS at 08:06

## 2018-06-07 RX ADMIN — AMLODIPINE BESYLATE 5 MG: 5 TABLET ORAL at 08:06

## 2018-06-07 RX ADMIN — CEFAZOLIN SODIUM 1 G: 1 INJECTION, POWDER, FOR SOLUTION INTRAMUSCULAR; INTRAVENOUS at 05:06

## 2018-06-07 RX ADMIN — DULOXETINE 30 MG: 30 CAPSULE, DELAYED RELEASE ORAL at 08:06

## 2018-06-07 RX ADMIN — LISINOPRIL 20 MG: 20 TABLET ORAL at 08:06

## 2018-06-07 NOTE — HOSPITAL COURSE
6/6: OR today for a left frontotemporal craniotomy with excision of pituitary tumor with neuronavigation and microsurgery. No intra op complications. Patient tolerated procedure well. Admitted to [facility] post op.   6/7: Post op CT head with blood in ventricles; Follow up scan scheduled today. SG drain remains in place.  6/8: Repeat head CT stable. Patient more awake and alert than yesterday. SG drain remains in place.  6/9: SG drain remains in place. Vision improving. No signs of developing DI.   6/10: SG drain pulled in AM. Patient tolerated removal well. Emesis this afternoon, CTH stable.  6/12: Encephalopathic this am, sodium drop to 132mmHg, no obvious development of excessive diuresis. Urine studies pending, fluid restrict, EEG x 24 hrs.  6/13: Exam improved this morning. Na improved with fluid restriction. Pending step down to NSGY service. DVT ppx started.   6/14: Pt remains stable. Requesting to go home with HH. Follow up appts made.   6/15: Exam stable. Na 136. PT/OT recommending Rehab. Patient will DC with home health and family support. Rehab home eval scheduled for Monday with plans to admit patient from home. Discharge instructions given verbally to patient and family. All of their questions were answered. They were encouraged to call the clinic with any questions or concerns prior to follow up appt.

## 2018-06-07 NOTE — ANESTHESIA POSTPROCEDURE EVALUATION
"Anesthesia Post Evaluation    Patient: Florence Everett    Procedure(s) Performed: Procedure(s) (LRB):  CRANIOTOMY, USING FRAMELESS STEREOTAXY (STEALTH) (Left)    Final Anesthesia Type: general  Patient location during evaluation: PACU  Patient participation: Yes- Able to Participate  Level of consciousness: awake and alert and oriented  Post-procedure vital signs: reviewed and stable  Pain management: adequate  Airway patency: patent  PONV status at discharge: No PONV  Anesthetic complications: no      Cardiovascular status: hemodynamically stable  Respiratory status: unassisted, spontaneous ventilation and room air  Hydration status: euvolemic  Follow-up not needed.        Visit Vitals  BP (!) 109/55   Pulse 79   Temp 37.2 °C (99 °F) (Axillary)   Resp 15   Ht 5' 4" (1.626 m)   Wt 71.8 kg (158 lb 4.6 oz)   SpO2 97%   Breastfeeding? No   BMI 27.17 kg/m²       Pain/Yolande Score: Pain Assessment Performed: Yes (6/7/2018  5:05 PM)  Presence of Pain: denies (6/7/2018  5:05 PM)  Pain Rating Prior to Med Admin: 4 (6/6/2018  4:27 PM)  Yolande Score: 9 (6/6/2018 10:00 PM)      "

## 2018-06-07 NOTE — ASSESSMENT & PLAN NOTE
83F with likely pituitary adenoma now s/p crani for resection.    -- Patient without vision bilaterally.  -- CT head satisfactory.  -- Drain to remain, output: 255  -- Ancef while drain in place.   -- Hydrocortisone 50 q12 today, 20am 10pm tomorrow  -- Monitor for DI, strict IOs  -- Pain control.  -- Zofran for nausea prn.  -- Regular diet.  -- Vitals per unit routine  -- PT/OT daily and for recs. Patient OOB >4h/day.  -- PPx: PPI, SQH, IS, SCDs, bowel regimen  -- Dispo: cont ICU care

## 2018-06-07 NOTE — ASSESSMENT & PLAN NOTE
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   -NSGY following   -Repeat MRI brain ordered per NSGY  -Follow up CT head 6/7/18 with intraventricular hemorrhage with mild distension of the lateral ventricles concerning for developing hydrocephalus.  -NSGY aware of scan and ordered repeat CT head at noon  -SBP <160   -hydrocortisone per NSGY

## 2018-06-07 NOTE — PLAN OF CARE
ICU Attending Note  Neurocritical Care    [id number]  Too drowsy to assess visual fields. Pupils reactive.    -repeat CT head until stable  --160  -amlodipine, lisinopril  -start NS 75 mL/h  -hydrocortisone per Neurosurgery  -hold heparin prophylaxis given IVH

## 2018-06-07 NOTE — TRANSFER OF CARE
"Anesthesia Transfer of Care Note    Patient: Florence Everett    Procedure(s) Performed: Procedure(s) (LRB):  CRANIOTOMY, USING FRAMELESS STEREOTAXY (STEALTH) (Left)    Patient location: PACU    Anesthesia Type: general    Transport from OR: Transported from OR on 6-10 L/min O2 by face mask with adequate spontaneous ventilation    Post pain: adequate analgesia    Post assessment: no apparent anesthetic complications    Post vital signs: stable    Level of consciousness: awake and lethargic    Nausea/Vomiting: no nausea/vomiting    Complications: none    Transfer of care protocol was followed      Last vitals:   Visit Vitals  /60   Pulse 80   Temp 36.9 °C (98.4 °F) (Axillary)   Resp 15   Ht 5' 4" (1.626 m)   Wt 71.8 kg (158 lb 4.6 oz)   SpO2 96%   Breastfeeding? No   BMI 27.17 kg/m²     "

## 2018-06-07 NOTE — PROGRESS NOTES
Pt transported to and from CT via bed on cardiac monitor and portable 02 with RN and PCT. No distress noted, VSS. Will continue to monitor

## 2018-06-07 NOTE — PROGRESS NOTES
[facility]  Neurosurgery  Progress Note    Subjective:     History of Present Illness: No notes on file    Post-Op Info:  Procedure(s) (LRB):  CRANIOTOMY, USING FRAMELESS STEREOTAXY (STEALTH) (Left)   1 Day Post-Op     Interval History: OR yesterday for resection of pituitary mass. NAEON.     Medications:  Continuous Infusions:   sodium chloride 0.9% 75 mL/hr at 06/07/18 1505     Scheduled Meds:   amLODIPine  5 mg Oral QHS    DULoxetine  30 mg Oral Daily    hydrocortisone sodium succinate  50 mg Intravenous Q12H    lisinopril  20 mg Oral Daily    pantoprazole  40 mg Oral Daily     PRN Meds:fentaNYL, hydrALAZINE, labetalol, oxyCODONE-acetaminophen     Review of Systems  Objective:     Weight: 71.8 kg (158 lb 4.6 oz)  Body mass index is 27.17 kg/m².  Vital Signs (Most Recent):  Temp: 99 °F (37.2 °C) (06/07/18 1505)  Pulse: 79 (06/07/18 1505)  Resp: 12 (06/07/18 1505)  BP: 120/62 (06/07/18 1505)  SpO2: 97 % (06/07/18 1505) Vital Signs (24h Range):  Temp:  [97.9 °F (36.6 °C)-99.4 °F (37.4 °C)] 99 °F (37.2 °C)  Pulse:  [] 79  Resp:  [12-22] 12  SpO2:  [90 %-97 %] 97 %  BP: ()/(52-70) 120/62  Arterial Line BP: (106-150)/(44-63) 110/57       Date 06/07/18 0700 - 06/08/18 0659   Shift 3047-4164 6552-0719 7887-7921 24 Hour Total   I  N  T  A  K  E   P.O. 75   75    I.V.  (mL/kg) 182.5  (2.5) 75  (1)  257.5  (3.6)    Shift Total  (mL/kg) 257.5  (3.6) 75  (1)  332.5  (4.6)   O  U  T  P  U  T   Urine  (mL/kg/hr) 380  (0.7)   380    Shift Total  (mL/kg) 380  (5.3)   380  (5.3)   Weight (kg) 71.8 71.8 71.8 71.8            Closed/Suction Drain 06/06/18 1333 Right Other (Comment) Accordion 10 Fr. (Active)   Site Description Unable to view 6/7/2018  3:05 PM   Dressing Type Gauze 6/7/2018  3:05 PM   Dressing Status Clean;Dry;Intact 6/7/2018  3:05 PM   Drainage Serosanguineous 6/7/2018  3:05 PM   Status Other (Comment) 6/7/2018  3:05 PM   Output (mL) 70 mL 6/7/2018  5:00 AM            Urethral  "Catheter 06/06/18 0835 Non-latex 16 Fr. (Active)   Site Assessment Clean;Intact 6/7/2018  3:05 PM   Collection Container Urimeter 6/7/2018  3:05 PM   Securement Method secured to top of thigh w/ adhesive device 6/7/2018  3:05 PM   Catheter Care Performed yes 6/7/2018  7:04 AM   Reason for Continuing Urinary Catheterization Post operative 6/7/2018  3:05 PM   CAUTI Prevention Bundle StatLock in place w 1" slack;Intact seal between catheter & drainage tubing;Drainage bag off the floor;Green sheeting clip in use;No dependent loops or kinks;Drainage bag not overfilled (<2/3 full);Drainage bag below bladder 6/7/2018  7:04 AM   Output (mL) 30 mL 6/7/2018  2:04 PM       Neurosurgery Physical Exam    Awake, alert, oriented x2.   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  FS TM EOMI.    Significant Labs:    Recent Labs  Lab 06/06/18  0714 06/07/18  0305   GLU 98 162*    140   K 3.8 3.5    108   CO2 24 25   BUN 17 9   CREATININE 0.8 0.7   CALCIUM 9.3 8.2*   MG  --  2.0       Recent Labs  Lab 06/06/18  0714 06/06/18  0948 06/06/18  1219 06/07/18  0305   WBC 6.04  --   --  13.51*   HGB 12.7  --   --  11.0*   HCT 38.4 31* 29* 34.3*     --   --  200       Recent Labs  Lab 06/06/18  0714   INR 0.9   APTT 21.9     Microbiology Results (last 7 days)     ** No results found for the last 168 hours. **        All pertinent labs from the last 24 hours have been reviewed.    Significant Diagnostics:  I have reviewed all pertinent imaging results/findings within the past 24 hours.    Assessment/Plan:     * Pituitary mass    83F with likely pituitary adenoma now s/p crani for resection.    -- Patient without vision bilaterally.  -- CT head satisfactory.  -- Drain to remain, output: 255  -- Ancef while drain in place.   -- Hydrocortisone 50 q12 today, 20am 10pm tomorrow  -- Monitor for DI, strict IOs  -- Pain control.  -- Zofran for nausea prn.  -- Regular diet.  -- Vitals per unit routine  -- PT/OT daily and for " recs. Patient OOB >4h/day.  -- PPx: PPI, SQH, IS, SCDs, bowel regimen  -- Dispo: cont ICU care                Álvaro Crespo MD  Neurosurgery  [facility]

## 2018-06-07 NOTE — PLAN OF CARE
POC reviewed with pt and family at 1600. Pt and daughter verbalized understanding. Questions and concerns addressed. No acute events today, pt more alert this afternoon. CT completed. Pt progressing toward goals. Will continue to monitor. See flowsheets for full assessment and VS info.

## 2018-06-07 NOTE — PROGRESS NOTES
[facility]  Neurocritical Care  Progress Note    Admit Date: 6/6/2018  Service Date: 06/07/2018  Length of Stay: 1    Subjective:     Chief Complaint: Pituitary mass    History of Present Illness: Patient is a 83 year old Female of HTN who is s/p left frontotemporal craniotomy with excision of pituitary tumor with neuronavigation and microsurgery. Patient presented to NSGY clinic 5/29/18 endorsing decreased vision in her left eye for the past year. In March, she suffered a fall without LOC and had extensive facial injuries. A CT of the brain was performed that showed a pituitary tumor. MRI brain then was performed that showed a large pituitary tumor with considerable suprasellar extension. On her physical exam at the time of clinic visit, she was noted to have full ocular movements, bitemporal hemianopsia and vision decreased in her left eye to barely counting. Decision was made for excision of the adenoma.   Patient is s/p excision of tumor 6/6/18. She will be admitted to [facility] for higher level of care.     Hospital Course: 6/6: s/p left frontotemporal craniotomy with excision of pituitary tumor with neuronavigation and microsurgery; admitted to [facility]   6/7: Follow up CT head with blood in ventricles; Follow up scan scheduled today    Interval History:    Patient remains drowsy on exam. Follow up CT head with blood in ventricles. Follow up CT scan at noon.     Review of Systems  Unable to obtain a complete ROS due to level of consciousness.  Objective:     Vitals:  Temp: 99 °F (37.2 °C)  Pulse: 79  Rhythm: normal sinus rhythm  BP: 120/62  MAP (mmHg): 83  Resp: 12  SpO2: 97 %  O2 Device (Oxygen Therapy): nasal cannula    Temp  Min: 97.9 °F (36.6 °C)  Max: 99.4 °F (37.4 °C)  Pulse  Min: 79  Max: 114  BP  Min: 94/70  Max: 135/62  MAP (mmHg)  Min: 68  Max: 89  Resp  Min: 12  Max: 22  SpO2  Min: 90 %  Max: 97 %    06/06 0701 - 06/07 0700  In: 3560 [P.O.:60; I.V.:3500]  Out: 2485 [Urine:2160; Drains:325]            Physical Exam   Constitutional: She appears well-developed and well-nourished.   HENT:   Head: Normocephalic.   Subgaleal drain in place.   Cardiovascular: Normal rate.    Pulmonary/Chest: Effort normal.   Nursing note and vitals reviewed.  Mental Status: Patient opens eyes briefly to tactile stimuli.   grossly intact. no gross facial asym   PERRL, unable to perform visual exam due to mental status   Motor: Moving all extremities against gravity  Sensory,co-ordination and gait unable to assess sec to mental status    Medications:  Continuous  sodium chloride 0.9% Last Rate: 75 mL/hr at 06/07/18 1505   Scheduled  amLODIPine 5 mg QHS   DULoxetine 30 mg Daily   hydrocortisone sodium succinate 50 mg Q12H   lisinopril 20 mg Daily   pantoprazole 40 mg Daily   PRN  fentaNYL 25 mcg Q1H PRN   hydrALAZINE 20 mg Q6H PRN   labetalol 10 mg Q10 Min PRN   oxyCODONE-acetaminophen 1 tablet Q4H PRN     Today I personally reviewed pertinent medications, imaging, lab results, notably:    Diet  Diet Adult Regular (IDDSI Level 7)  Diet Adult Regular (IDDSI Level 7)          Assessment/Plan:     Cardiac/Vascular   Essential hypertension    -SBP < 160  -Continue home meds of Lisinopril 20 mg and Amlodipine 5 mg daily         Endocrine   * Pituitary mass    -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  -NSGY aware of scan and ordered repeat CT head at noon  -SBP <160   -hydrocortisone per NSGY              Prophylaxis:  Venous Thromboembolism: none  Stress Ulcer: None  Ventilator Pneumonia: Not applicable     Activity Orders          Up with assistance starting at 06/07  0600        No Order    Vonda Ramírez MD  Neurocritical Care  [facility]

## 2018-06-07 NOTE — PLAN OF CARE
06/07/18 1623   Discharge Assessment   Assessment Type Discharge Planning Assessment   Confirmed/corrected address and phone number on facesheet? Yes   Assessment information obtained from? Caregiver  (dtr, Kendal Mcclure)   Expected Length of Stay (days) 5   Communicated expected length of stay with patient/caregiver yes   Prior to hospitilization cognitive status: Alert/Oriented   Prior to hospitalization functional status: Independent   Current cognitive status: Unable to Assess  (patient sleeping)   Current Functional Status: Needs Assistance   Lives With spouse  (Per daughter, patient is primary caretaker for spouse)   Able to Return to Prior Arrangements unable to determine at this time (comments)   Who are your caregiver(s) and their phone number(s)? Radha Patel (dtr)  [phone], Conchita Tiwari (dtr) [phone]. Kendal Mcclure (dtr)    Patient's perception of discharge disposition other (comments)  (primo)   Readmission Within The Last 30 Days no previous admission in last 30 days   Patient currently being followed by outpatient case management? No   Patient currently receives any other outside agency services? No   Equipment Currently Used at Home none   Do you have any problems affording any of your prescribed medications? No   Is the patient taking medications as prescribed? yes   Does the patient have transportation home? Yes   Transportation Available family or friend will provide   Does the patient receive services at the Coumadin Clinic? No   Discharge Plan A Home;Home Health   Discharge Plan B Rehab   Patient/Family In Agreement With Plan yes         Discharge/ My Health Packet Folder Given to patient/family:      Yes        PCP:  Doc Moore MD        Pharmacy:    [facility] [address] [place] [address] [address] [facility]  [address] [facility] [address]  Phone: [phone] Fax: [phone]        Emergency Contacts:  Extended Emergency Contact  Information  Primary Emergency Contact: Hernandez Patela   [place]  Mobile Phone: [phone]  Relation: Daughter  Secondary Emergency Contact: TiwariConchita   [facility]  Mobile Phone: [phone]  Relation: Daughter      Insurance:  Payor: MEDICARE / Plan: MEDICARE PART A & B / Product Type: Government /       Sivan Bill RN, CCRN-K, [facility]  Neuro-Critical Care   X [phone]

## 2018-06-07 NOTE — PLAN OF CARE
Patient resting quietly in bed. Denies current pain or nausea. VSS. Dressing to left head incision remains CDI. Drain in place with moderate output. Tolerating sips of water. Follows commands. Continues to be oriented to self only. [place] in place draining CYU. Safety maintained. Updated daughter via text of patients anticipated room number.

## 2018-06-07 NOTE — PLAN OF CARE
Problem: Patient Care Overview  Goal: Plan of Care Review  Outcome: Ongoing (interventions implemented as appropriate)  POC reviewed with pt at 0500. Pt verbalized understanding. Questions and concerns addressed. No acute events overnight. CT Done. Pt progressing toward goals. Will continue to monitor. See flowsheets for full assessment and VS info

## 2018-06-07 NOTE — PROGRESS NOTES
Patient arrived to [facility] from PACU    Type of stroke/diagnosis: Pituitary Adenoma Removal    TPA start and end time (if applicable)    Thrombectomy start and end time (if applicable)    Skin assessment done: Y  Wounds noted: None    NCC notified: MD Sameer

## 2018-06-07 NOTE — SUBJECTIVE & OBJECTIVE
Interval History:    Patient remains drowsy on exam. Follow up CT head with blood in ventricles. Follow up CT scan at noon.     Review of Systems  Unable to obtain a complete ROS due to level of consciousness.  Objective:     Vitals:  Temp: 99 °F (37.2 °C)  Pulse: 79  Rhythm: normal sinus rhythm  BP: 120/62  MAP (mmHg): 83  Resp: 12  SpO2: 97 %  O2 Device (Oxygen Therapy): nasal cannula    Temp  Min: 97.9 °F (36.6 °C)  Max: 99.4 °F (37.4 °C)  Pulse  Min: 79  Max: 114  BP  Min: 94/70  Max: 135/62  MAP (mmHg)  Min: 68  Max: 89  Resp  Min: 12  Max: 22  SpO2  Min: 90 %  Max: 97 %    06/06 0701 - 06/07 0700  In: 3560 [P.O.:60; I.V.:3500]  Out: 2485 [Urine:2160; Drains:325]           Physical Exam   Constitutional: She appears well-developed and well-nourished.   HENT:   Head: Normocephalic.   Subgaleal drain in place.   Cardiovascular: Normal rate.    Pulmonary/Chest: Effort normal.   Nursing note and vitals reviewed.  Mental Status: Patient opens eyes briefly to tactile stimuli.   grossly intact. no gross facial asym   PERRL, unable to perform visual exam due to mental status   Motor: Moving all extremities against gravity  Sensory,co-ordination and gait unable to assess sec to mental status    Medications:  Continuous  sodium chloride 0.9% Last Rate: 75 mL/hr at 06/07/18 1505   Scheduled  amLODIPine 5 mg QHS   DULoxetine 30 mg Daily   hydrocortisone sodium succinate 50 mg Q12H   lisinopril 20 mg Daily   pantoprazole 40 mg Daily   PRN  fentaNYL 25 mcg Q1H PRN   hydrALAZINE 20 mg Q6H PRN   labetalol 10 mg Q10 Min PRN   oxyCODONE-acetaminophen 1 tablet Q4H PRN     Today I personally reviewed pertinent medications, imaging, lab results, notably:    Diet  Diet Adult Regular (IDDSI Level 7)  Diet Adult Regular (IDDSI Level 7)

## 2018-06-07 NOTE — NURSING TRANSFER
Nursing Transfer Note      6/6/2018     Transfer to: 7075    Transfer via: Bed    Transfer with: Transport Monitor; Oxygen at 2L via NC    Transported by: RN and PCT    Medicines sent: N/A    Chart send with patient: Yes    Notified: daughter; Report called to LYNDA Gilbert    Patient reassessed at: 2100

## 2018-06-07 NOTE — SUBJECTIVE & OBJECTIVE
Interval History: OR yesterday for resection of pituitary mass. NAEON.     Medications:  Continuous Infusions:   sodium chloride 0.9% 75 mL/hr at 06/07/18 1505     Scheduled Meds:   amLODIPine  5 mg Oral QHS    DULoxetine  30 mg Oral Daily    hydrocortisone sodium succinate  50 mg Intravenous Q12H    lisinopril  20 mg Oral Daily    pantoprazole  40 mg Oral Daily     PRN Meds:fentaNYL, hydrALAZINE, labetalol, oxyCODONE-acetaminophen     Review of Systems  Objective:     Weight: 71.8 kg (158 lb 4.6 oz)  Body mass index is 27.17 kg/m².  Vital Signs (Most Recent):  Temp: 99 °F (37.2 °C) (06/07/18 1505)  Pulse: 79 (06/07/18 1505)  Resp: 12 (06/07/18 1505)  BP: 120/62 (06/07/18 1505)  SpO2: 97 % (06/07/18 1505) Vital Signs (24h Range):  Temp:  [97.9 °F (36.6 °C)-99.4 °F (37.4 °C)] 99 °F (37.2 °C)  Pulse:  [] 79  Resp:  [12-22] 12  SpO2:  [90 %-97 %] 97 %  BP: ()/(52-70) 120/62  Arterial Line BP: (106-150)/(44-63) 110/57       Date 06/07/18 0700 - 06/08/18 0659   Shift 6265-7054 2933-0246 2559-3864 24 Hour Total   I  N  T  A  K  E   P.O. 75   75    I.V.  (mL/kg) 182.5  (2.5) 75  (1)  257.5  (3.6)    Shift Total  (mL/kg) 257.5  (3.6) 75  (1)  332.5  (4.6)   O  U  T  P  U  T   Urine  (mL/kg/hr) 380  (0.7)   380    Shift Total  (mL/kg) 380  (5.3)   380  (5.3)   Weight (kg) 71.8 71.8 71.8 71.8            Closed/Suction Drain 06/06/18 1333 Right Other (Comment) Accordion 10 Fr. (Active)   Site Description Unable to view 6/7/2018  3:05 PM   Dressing Type Gauze 6/7/2018  3:05 PM   Dressing Status Clean;Dry;Intact 6/7/2018  3:05 PM   Drainage Serosanguineous 6/7/2018  3:05 PM   Status Other (Comment) 6/7/2018  3:05 PM   Output (mL) 70 mL 6/7/2018  5:00 AM            Urethral Catheter 06/06/18 0835 Non-latex 16 Fr. (Active)   Site Assessment Clean;Intact 6/7/2018  3:05 PM   Collection Container Urimeter 6/7/2018  3:05 PM   Securement Method secured to top of thigh w/ adhesive device 6/7/2018  3:05 PM   Catheter  "Care Performed yes 6/7/2018  7:04 AM   Reason for Continuing Urinary Catheterization Post operative 6/7/2018  3:05 PM   CAUTI Prevention Bundle StatLock in place w 1" slack;Intact seal between catheter & drainage tubing;Drainage bag off the floor;Green sheeting clip in use;No dependent loops or kinks;Drainage bag not overfilled (<2/3 full);Drainage bag below bladder 6/7/2018  7:04 AM   Output (mL) 30 mL 6/7/2018  2:04 PM       Neurosurgery Physical Exam    Awake, alert, oriented x2.   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  FS TM EOMI.    Significant Labs:    Recent Labs  Lab 06/06/18  0714 06/07/18  0305   GLU 98 162*    140   K 3.8 3.5    108   CO2 24 25   BUN 17 9   CREATININE 0.8 0.7   CALCIUM 9.3 8.2*   MG  --  2.0       Recent Labs  Lab 06/06/18  0714 06/06/18  0948 06/06/18  1219 06/07/18  0305   WBC 6.04  --   --  13.51*   HGB 12.7  --   --  11.0*   HCT 38.4 31* 29* 34.3*     --   --  200       Recent Labs  Lab 06/06/18  0714   INR 0.9   APTT 21.9     Microbiology Results (last 7 days)     ** No results found for the last 168 hours. **        All pertinent labs from the last 24 hours have been reviewed.    Significant Diagnostics:  I have reviewed all pertinent imaging results/findings within the past 24 hours.  "

## 2018-06-08 PROBLEM — S06.5XAA SDH (SUBDURAL HEMATOMA): Status: ACTIVE | Noted: 2018-06-08

## 2018-06-08 PROBLEM — H53.9 VISUAL DISTURBANCE: Status: ACTIVE | Noted: 2018-06-08

## 2018-06-08 PROBLEM — I61.0 NONTRAUMATIC SUBCORTICAL HEMORRHAGE OF RIGHT CEREBRAL HEMISPHERE: Status: ACTIVE | Noted: 2018-06-08

## 2018-06-08 PROBLEM — G93.5 BRAIN COMPRESSION: Status: ACTIVE | Noted: 2018-06-08

## 2018-06-08 LAB
ANION GAP SERPL CALC-SCNC: 8 MMOL/L
BASOPHILS # BLD AUTO: 0.02 K/UL
BASOPHILS NFR BLD: 0.1 %
BUN SERPL-MCNC: 9 MG/DL
CALCIUM SERPL-MCNC: 8.1 MG/DL
CHLORIDE SERPL-SCNC: 112 MMOL/L
CO2 SERPL-SCNC: 25 MMOL/L
CREAT SERPL-MCNC: 0.7 MG/DL
DIFFERENTIAL METHOD: ABNORMAL
EOSINOPHIL # BLD AUTO: 0 K/UL
EOSINOPHIL NFR BLD: 0 %
ERYTHROCYTE [DISTWIDTH] IN BLOOD BY AUTOMATED COUNT: 14.8 %
EST. GFR  (AFRICAN AMERICAN): >60 ML/MIN/1.73 M^2
EST. GFR  (NON AFRICAN AMERICAN): >60 ML/MIN/1.73 M^2
GLUCOSE SERPL-MCNC: 129 MG/DL
HCT VFR BLD AUTO: 31.6 %
HGB BLD-MCNC: 10.1 G/DL
IMM GRANULOCYTES # BLD AUTO: 0.14 K/UL
IMM GRANULOCYTES NFR BLD AUTO: 1 %
LYMPHOCYTES # BLD AUTO: 1.3 K/UL
LYMPHOCYTES NFR BLD: 9.2 %
MAGNESIUM SERPL-MCNC: 2.3 MG/DL
MCH RBC QN AUTO: 30.1 PG
MCHC RBC AUTO-ENTMCNC: 32 G/DL
MCV RBC AUTO: 94 FL
MONOCYTES # BLD AUTO: 1.4 K/UL
MONOCYTES NFR BLD: 9.9 %
NEUTROPHILS # BLD AUTO: 11.7 K/UL
NEUTROPHILS NFR BLD: 79.8 %
NRBC BLD-RTO: 0 /100 WBC
PLATELET # BLD AUTO: 182 K/UL
PMV BLD AUTO: 10.9 FL
POTASSIUM SERPL-SCNC: 3.5 MMOL/L
RBC # BLD AUTO: 3.36 M/UL
SODIUM SERPL-SCNC: 145 MMOL/L
WBC # BLD AUTO: 14.6 K/UL

## 2018-06-08 PROCEDURE — 99233 SBSQ HOSP IP/OBS HIGH 50: CPT | Mod: ,,, | Performed by: PSYCHIATRY & NEUROLOGY

## 2018-06-08 PROCEDURE — 20000000 HC ICU ROOM

## 2018-06-08 PROCEDURE — 80048 BASIC METABOLIC PNL TOTAL CA: CPT

## 2018-06-08 PROCEDURE — 85025 COMPLETE CBC W/AUTO DIFF WBC: CPT

## 2018-06-08 PROCEDURE — 63600175 PHARM REV CODE 636 W HCPCS: Performed by: STUDENT IN AN ORGANIZED HEALTH CARE EDUCATION/TRAINING PROGRAM

## 2018-06-08 PROCEDURE — 25000003 PHARM REV CODE 250: Performed by: STUDENT IN AN ORGANIZED HEALTH CARE EDUCATION/TRAINING PROGRAM

## 2018-06-08 PROCEDURE — 83735 ASSAY OF MAGNESIUM: CPT

## 2018-06-08 RX ORDER — HYDROCORTISONE 20 MG/1
20 TABLET ORAL EVERY MORNING
Status: DISCONTINUED | OUTPATIENT
Start: 2018-06-08 | End: 2018-06-15 | Stop reason: HOSPADM

## 2018-06-08 RX ORDER — HYDROCORTISONE 10 MG/1
10 TABLET ORAL NIGHTLY
Status: DISCONTINUED | OUTPATIENT
Start: 2018-06-08 | End: 2018-06-15 | Stop reason: HOSPADM

## 2018-06-08 RX ADMIN — LISINOPRIL 20 MG: 20 TABLET ORAL at 09:06

## 2018-06-08 RX ADMIN — HYDROCORTISONE 10 MG: 10 TABLET ORAL at 09:06

## 2018-06-08 RX ADMIN — HYDROCORTISONE SODIUM SUCCINATE 50 MG: 100 INJECTION, POWDER, FOR SOLUTION INTRAMUSCULAR; INTRAVENOUS at 09:06

## 2018-06-08 RX ADMIN — AMLODIPINE BESYLATE 5 MG: 5 TABLET ORAL at 09:06

## 2018-06-08 RX ADMIN — DULOXETINE 30 MG: 30 CAPSULE, DELAYED RELEASE ORAL at 09:06

## 2018-06-08 RX ADMIN — PANTOPRAZOLE SODIUM 40 MG: 40 TABLET, DELAYED RELEASE ORAL at 09:06

## 2018-06-08 RX ADMIN — CEFAZOLIN SODIUM 1 G: 1 INJECTION, POWDER, FOR SOLUTION INTRAMUSCULAR; INTRAVENOUS at 02:06

## 2018-06-08 RX ADMIN — CEFAZOLIN SODIUM 1 G: 1 INJECTION, POWDER, FOR SOLUTION INTRAMUSCULAR; INTRAVENOUS at 09:06

## 2018-06-08 NOTE — PLAN OF CARE
Problem: Patient Care Overview  Goal: Plan of Care Review  Outcome: Ongoing (interventions implemented as appropriate)  No acute events throughout the day, VS and assessment per flow sheet, patient progressing towards goal as tolerated. Complete bath given. Plan of care reviewed with Florence Everett and family, all concerns addressed. Will continue to monitor.

## 2018-06-08 NOTE — PROGRESS NOTES
[facility]  Neurocritical Care  Progress Note    Admit Date: 6/6/2018  Service Date: 06/08/2018  Length of Stay: 2    Subjective:     Chief Complaint: Pituitary mass    History of Present Illness: Patient is a 83 year old Female of HTN who is s/p left frontotemporal craniotomy with excision of pituitary tumor with neuronavigation and microsurgery. Patient presented to NSGY clinic 5/29/18 endorsing decreased vision in her left eye for the past year. In March, she suffered a fall without LOC and had extensive facial injuries. A CT of the brain was performed that showed a pituitary tumor. MRI brain then was performed that showed a large pituitary tumor with considerable suprasellar extension. On her physical exam at the time of clinic visit, she was noted to have full ocular movements, bitemporal hemianopsia and vision decreased in her left eye to barely counting. Decision was made for excision of the adenoma.   Patient is s/p excision of tumor 6/6/18. She will be admitted to [facility] for higher level of care.     Hospital Course: 6/6: s/p left frontotemporal craniotomy with excision of pituitary tumor with neuronavigation and microsurgery; admitted to [facility]   6/7: Follow up CT head with blood in ventricles; Follow up scan scheduled today  6/8: Patient more awake and alert than prior     Interval History:    CT head yesterday stable. Patient more alert today.     Review of Systems   Constitutional: Negative for fever.   HENT: Negative for trouble swallowing.    Eyes: Positive for visual disturbance.   Respiratory: Negative for shortness of breath.    Cardiovascular: Negative for chest pain.       Objective:     Vitals:  Temp: 98.5 °F (36.9 °C)  Pulse: 87  Rhythm: normal sinus rhythm  BP: (!) 157/82  MAP (mmHg): 111  Resp: 16  SpO2: 96 %  O2 Device (Oxygen Therapy): nasal cannula    Temp  Min: 98.2 °F (36.8 °C)  Max: 99 °F (37.2 °C)  Pulse  Min: 77  Max: 93  BP  Min: 109/55  Max: 161/68  MAP (mmHg)  Min: 74   Max: 1074  Resp  Min: 12  Max: 20  SpO2  Min: 96 %  Max: 99 %    06/07 0701 - 06/08 0700  In: 1457.5 [P.O.:75; I.V.:1382.5]  Out: 2145 [Urine:2030; Drains:115]   Unmeasured Output  Stool Occurrence: 0       Physical Exam   Constitutional: She is oriented to person, place, and time. She appears well-developed and well-nourished.   HENT:   Head: Normocephalic.   Subgaleal drain in place   Cardiovascular: Normal rate.    Pulmonary/Chest: Effort normal.   Neurological: She is alert and oriented to person, place, and time.   Nursing note and vitals reviewed.  Alert and oriented to self, month, year   CN: PERRL, patient with decreased vision bilaterally, face appears grossly symmetric  Motor: Moves all extremities spontaneously       Medications:  Continuous  sodium chloride 0.9% Last Rate: 75 mL/hr at 06/08/18 1200   Scheduled  amLODIPine 5 mg QHS   DULoxetine 30 mg Daily   hydrocortisone 10 mg QHS   hydrocortisone 20 mg QAM   lisinopril 20 mg Daily   pantoprazole 40 mg Daily   PRN  fentaNYL 25 mcg Q1H PRN   hydrALAZINE 20 mg Q6H PRN   labetalol 10 mg Q10 Min PRN   oxyCODONE-acetaminophen 1 tablet Q4H PRN     Today I personally reviewed pertinent medications, imaging, lab results, notably:    Diet  Diet Adult Regular (IDDSI Level 7)  Diet Adult Regular (IDDSI Level 7)        Assessment/Plan:     Endocrine   * Pituitary mass    -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  -Patient s/p left frontotemporal craniotomy, excision of pituitary tumor with neuronavigation and microsurgery  -NSGY following   -Subgaleal drain in place  -Ancef while drain in place  -Admitted to [facility]   -Follow up CT head 6/7/18 with intraventricular hemorrhage with mild distension of the lateral ventricles concerning for developing hydrocephalus; repeat scan stable   -SBP <160   -hydrocortisone per NSGY  -Monitoring for DI                Prophylaxis:  Venous Thromboembolism: mechanical  Stress Ulcer: PPI  Ventilator  Pneumonia: not applicable     Activity Orders          Up with assistance starting at 06/07 0600        No Order    Vonda Ramírez MD  Neurocritical Care  [facility]

## 2018-06-08 NOTE — OP NOTE
DATE OF PROCEDURE:  06/06/2018.    ATTENDING PHYSICIAN:  Keo Shaffer M.D.    PREOPERATIVE DIAGNOSIS:  Pituitary adenoma.    POSTOPERATIVE DIAGNOSIS:  Pituitary adenoma.    PROCEDURES PERFORMED:  Left frontotemporal craniotomy, excision of pituitary   tumor with neuronavigation and microsurgery.    SURGEON:  Keo Shaffer M.D.    ASSISTANT:  REBEKAH Crespo M.D. (RES) ([facility] Resident Neurosurgery).    ANESTHESIA:  General endotracheal.    ESTIMATED BLOOD LOSS:  300 mL.    DRAINS:  Hemovac subgaleal.    CONDITION AT END OF PROCEDURE:  Satisfactory.    BRIEF HISTORY:  This 83-year-old lady has noted progressive visual loss for at   least one year.  At this point, she shows a bitemporal hemianopsia with finger   counting only in the left eye.  The tumor extends up into the third ventricle   and has lateral lobulations.  It was felt this would be better addressed through   a craniotomy.    PROCEDURE IN DETAIL:  The patient had navigation MRI performed.  She was brought   to the Operating Room in supine position under premedication, intubated and   induced with general anesthesia.  A cannula was placed in the right radial   artery for continuous blood pressure monitoring.  A Tejada catheter inserted,   sequential compression devices were applied to legs and various intravenous   lines started.  The head was turned somewhat to the right and immobilized with   the Felipe 3-point skeletal fixation device.  The navigation arc was attached   to the headrest and the head registered to the system with surface recognition.    Appropriate landmarks were marked on the scalp.  The hair in the left   frontotemporal area was shaved and this portion of the scalp prepped with   Betadine and draped in a sterile fashion.  A curvilinear incision beginning at   the zygoma and coming behind the hairline to midline was outlined and this was   injected with 1% Xylocaine and epinephrine.  Incision was carried through the   skin and  galea, bleeding controlled in the skin margins and skin flap with Lizette   clips.  The skin was undermined in the subgaleal space down to the temporal   line.  The temporalis fascia was cut along the temporal line and elevated with   the skin and retracted with fishhook retractors.  The temporalis muscle was   quite thin.  This was cut with the Bovie cutting current,  from the   temporal line, elevated with a periosteal elevator and retracted inferiorly with   fishhook retractors.  A standard pterional craniotomy was outlined with cutting   the pericranium with the Bovie cutting current.  Daylin holes were placed in the   zygomatic process of the frontal bone and on the temporal squama and the   craniotomy cut with the high-speed drill, elevated and removed from the   operative field.  The dura anteriorly was paper thin and was already opened with   the craniotomy.  Additional bone was removed from the temporal squama and the   sphenoid ridge drilled down flat to bring exposure of the sylvian fissure area.    Drill holes were made in the bone margin.  The dura was tacked up to these   posteriorly where the dura remained intact.  The Duarte retractor was affixed   to the [place] headrest, clean towels were placed around the craniotomy   opening and the operating microscope was brought into the field.    The remainder of the dura was opened with a curvilinear incision and retracted   with 4-0 Nurolon sutures.  With microsurgical technique, the sylvian fissure was   opened over the branches of middle cerebral artery and down to the olfactory   and optic nerves.  The left optic nerve was stretched over the underlying tumor   and pushed toward the internal carotid artery.  The capsule of the tumor was   opened medial to the left optic nerve.  The tumor was fairly soft and some of   the tumor could be initially removed with suction and then additional tumor with   the Sonopet ultrasonic aspirator.  Gradually, as  the tumor was decompressed,   the capsule was coagulated, cut with scissors and the tumor  from the   sella.  Attention was then turned across the tumor and the right optic nerve   identified.  The carotid artery took an unusual course on the right side, coming   beneath the optic nerve and running medially.  This artery was quite ectatic   and atherosclerotic.  It required care to peel the capsule of the tumor off of   this and bring it more back to the left.  With reasonably good decompression,   the tumor was then dissected from the sub-chiasmatic area and along the   hypothalamus.  The infundibulum seemed to be pushed to the left.  The tumor was   then gradually peeled off posteriorly.  Some of the capsule of the tumor was   left rather than attempt to peel it off of the basilar artery interpeduncular   cistern.  Resection of tumor was carried down into the sella turcica.  With   this, both optic nerves seemed to be well decompressed.  The carotid arteries   appeared intact.  There was no significant bleeding.  Small bleeding points were   controlled with bipolar coagulation, Surgicel and Surgiflo, and the wound   thoroughly irrigated.  Some thrombin was left in the tumor bed.  The dura that   could be closed was closed with interrupted 4-0 Nurolon sutures and Duragen   placed over this.  A piece of Surgicel was used to help hold the Duragen in   place.  The bone flap was then returned in place using the [employer] microplate   system.  The temporalis muscle and fascia were reapproximated with interrupted   3-0 Vicryl sutures.  A Hemovac drain was placed in the subgaleal space and   brought out through a separate stab incision posterior to the primary incision.    The galea was closed with inverted interrupted 3-0 Vicryl sutures and the skin   closed with skin staples.  A Telfa bacitracin dressing was placed over this,   held in place with tape.  The patient's head was released from 3-point skeletal    fixation device.  She was returned to the full supine position, allowed to   awaken from anesthesia, extubated and left the Operating Room in satisfactory   condition.  She received 100 mg of hydrocortisone and 2 g of Rocephin at the   beginning of the procedure and bacitracin-containing antibiotic irrigating   solutions were used throughout.      CHEIKH/HN  dd: 06/07/2018 10:14:46 (CDT)  td: 06/07/2018 11:54:03 (CDT)  Doc ID   #[id number]  Job ID #[id number]    CC:

## 2018-06-08 NOTE — ASSESSMENT & PLAN NOTE
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

## 2018-06-08 NOTE — SUBJECTIVE & OBJECTIVE
Interval History:    CT head yesterday stable. Patient more alert today.     Review of Systems   Constitutional: Negative for fever.   HENT: Negative for trouble swallowing.    Eyes: Positive for visual disturbance.   Respiratory: Negative for shortness of breath.    Cardiovascular: Negative for chest pain.       Objective:     Vitals:  Temp: 98.5 °F (36.9 °C)  Pulse: 87  Rhythm: normal sinus rhythm  BP: (!) 157/82  MAP (mmHg): 111  Resp: 16  SpO2: 96 %  O2 Device (Oxygen Therapy): nasal cannula    Temp  Min: 98.2 °F (36.8 °C)  Max: 99 °F (37.2 °C)  Pulse  Min: 77  Max: 93  BP  Min: 109/55  Max: 161/68  MAP (mmHg)  Min: 74  Max: 1074  Resp  Min: 12  Max: 20  SpO2  Min: 96 %  Max: 99 %    06/07 0701 - 06/08 0700  In: 1457.5 [P.O.:75; I.V.:1382.5]  Out: 2145 [Urine:2030; Drains:115]   Unmeasured Output  Stool Occurrence: 0       Physical Exam   Constitutional: She is oriented to person, place, and time. She appears well-developed and well-nourished.   HENT:   Head: Normocephalic.   Subgaleal drain in place   Cardiovascular: Normal rate.    Pulmonary/Chest: Effort normal.   Neurological: She is alert and oriented to person, place, and time.   Nursing note and vitals reviewed.  Alert and oriented to self, month, year   CN: PERRL, patient with decreased vision bilaterally, face appears grossly symmetric  Motor: Moves all extremities spontaneously       Medications:  Continuous  sodium chloride 0.9% Last Rate: 75 mL/hr at 06/08/18 1200   Scheduled  amLODIPine 5 mg QHS   DULoxetine 30 mg Daily   hydrocortisone 10 mg QHS   hydrocortisone 20 mg QAM   lisinopril 20 mg Daily   pantoprazole 40 mg Daily   PRN  fentaNYL 25 mcg Q1H PRN   hydrALAZINE 20 mg Q6H PRN   labetalol 10 mg Q10 Min PRN   oxyCODONE-acetaminophen 1 tablet Q4H PRN     Today I personally reviewed pertinent medications, imaging, lab results, notably:    Diet  Diet Adult Regular (IDDSI Level 7)  Diet Adult Regular (IDDSI Level 7)

## 2018-06-09 LAB
ANION GAP SERPL CALC-SCNC: 6 MMOL/L
BASOPHILS # BLD AUTO: 0.03 K/UL
BASOPHILS NFR BLD: 0.3 %
BUN SERPL-MCNC: 8 MG/DL
CALCIUM SERPL-MCNC: 8.1 MG/DL
CHLORIDE SERPL-SCNC: 109 MMOL/L
CO2 SERPL-SCNC: 27 MMOL/L
CREAT SERPL-MCNC: 0.7 MG/DL
DIFFERENTIAL METHOD: ABNORMAL
EOSINOPHIL # BLD AUTO: 0 K/UL
EOSINOPHIL NFR BLD: 0.2 %
ERYTHROCYTE [DISTWIDTH] IN BLOOD BY AUTOMATED COUNT: 14.6 %
EST. GFR  (AFRICAN AMERICAN): >60 ML/MIN/1.73 M^2
EST. GFR  (NON AFRICAN AMERICAN): >60 ML/MIN/1.73 M^2
GLUCOSE SERPL-MCNC: 113 MG/DL
HCT VFR BLD AUTO: 31.2 %
HGB BLD-MCNC: 10 G/DL
IMM GRANULOCYTES # BLD AUTO: 0.21 K/UL
IMM GRANULOCYTES NFR BLD AUTO: 1.9 %
LYMPHOCYTES # BLD AUTO: 2.2 K/UL
LYMPHOCYTES NFR BLD: 19.4 %
MAGNESIUM SERPL-MCNC: 2 MG/DL
MCH RBC QN AUTO: 30.4 PG
MCHC RBC AUTO-ENTMCNC: 32.1 G/DL
MCV RBC AUTO: 95 FL
MONOCYTES # BLD AUTO: 1.3 K/UL
MONOCYTES NFR BLD: 11.9 %
NEUTROPHILS # BLD AUTO: 7.5 K/UL
NEUTROPHILS NFR BLD: 66.3 %
NRBC BLD-RTO: 0 /100 WBC
PLATELET # BLD AUTO: 162 K/UL
PMV BLD AUTO: 11.1 FL
POTASSIUM SERPL-SCNC: 3.3 MMOL/L
RBC # BLD AUTO: 3.29 M/UL
SODIUM SERPL-SCNC: 142 MMOL/L
WBC # BLD AUTO: 11.28 K/UL

## 2018-06-09 PROCEDURE — 27000221 HC OXYGEN, UP TO 24 HOURS

## 2018-06-09 PROCEDURE — 97162 PT EVAL MOD COMPLEX 30 MIN: CPT

## 2018-06-09 PROCEDURE — 20000000 HC ICU ROOM

## 2018-06-09 PROCEDURE — 83735 ASSAY OF MAGNESIUM: CPT

## 2018-06-09 PROCEDURE — 63600175 PHARM REV CODE 636 W HCPCS: Performed by: STUDENT IN AN ORGANIZED HEALTH CARE EDUCATION/TRAINING PROGRAM

## 2018-06-09 PROCEDURE — 85025 COMPLETE CBC W/AUTO DIFF WBC: CPT

## 2018-06-09 PROCEDURE — 25000003 PHARM REV CODE 250: Performed by: STUDENT IN AN ORGANIZED HEALTH CARE EDUCATION/TRAINING PROGRAM

## 2018-06-09 PROCEDURE — 94761 N-INVAS EAR/PLS OXIMETRY MLT: CPT

## 2018-06-09 PROCEDURE — 97165 OT EVAL LOW COMPLEX 30 MIN: CPT

## 2018-06-09 PROCEDURE — 80048 BASIC METABOLIC PNL TOTAL CA: CPT

## 2018-06-09 PROCEDURE — 99232 SBSQ HOSP IP/OBS MODERATE 35: CPT | Mod: ,,, | Performed by: PHYSICIAN ASSISTANT

## 2018-06-09 RX ADMIN — DULOXETINE 30 MG: 30 CAPSULE, DELAYED RELEASE ORAL at 07:06

## 2018-06-09 RX ADMIN — LISINOPRIL 20 MG: 20 TABLET ORAL at 07:06

## 2018-06-09 RX ADMIN — AMLODIPINE BESYLATE 5 MG: 5 TABLET ORAL at 08:06

## 2018-06-09 RX ADMIN — PANTOPRAZOLE SODIUM 40 MG: 40 TABLET, DELAYED RELEASE ORAL at 07:06

## 2018-06-09 RX ADMIN — HYDRALAZINE HYDROCHLORIDE 20 MG: 20 INJECTION INTRAMUSCULAR; INTRAVENOUS at 10:06

## 2018-06-09 RX ADMIN — HYDROCORTISONE 10 MG: 10 TABLET ORAL at 08:06

## 2018-06-09 RX ADMIN — HYDROCORTISONE 20 MG: 10 TABLET ORAL at 06:06

## 2018-06-09 NOTE — PROGRESS NOTES
[facility]  Neurocritical Care  Progress Note    Admit Date: 6/6/2018  Service Date: 06/09/2018  Length of Stay: 3    Subjective:     Chief Complaint: Pituitary mass    History of Present Illness: Patient is a 83 year old Female of HTN who is s/p left frontotemporal craniotomy with excision of pituitary tumor with neuronavigation and microsurgery. Patient presented to NSGY clinic 5/29/18 endorsing decreased vision in her left eye for the past year. In March, she suffered a fall without LOC and had extensive facial injuries. A CT of the brain was performed that showed a pituitary tumor. MRI brain then was performed that showed a large pituitary tumor with considerable suprasellar extension. On her physical exam at the time of clinic visit, she was noted to have full ocular movements, bitemporal hemianopsia and vision decreased in her left eye to barely counting. Decision was made for excision of the adenoma.   Patient is s/p excision of tumor 6/6/18. She will be admitted to [facility] for higher level of care.     Hospital Course: 6/6: s/p left frontotemporal craniotomy with excision of pituitary tumor with neuronavigation and microsurgery; admitted to [facility]   6/7: Follow up CT head with blood in ventricles; Follow up scan scheduled today  6/8: Patient more awake and alert than prior     Interval History: No significant events overnight. SG drain remains in place. Patient awake and fully oriented, vision improving, per daughter and patient. States she is able to see figures but still unable to distinguish features. No signs of developing DI.     Review of Systems: Reports vision difficulty as above. Denies HA, nausea, abdominal pain, focal weakness, speech difficulty, chest pain or shortness of breath.    Vitals:   Temp: 98.3 °F (36.8 °C)  Pulse: 96  Rhythm: normal sinus rhythm  BP: (!) 160/84  MAP (mmHg): 115  Resp: 18  SpO2: 97 %  O2 Device (Oxygen Therapy): nasal cannula    Temp  Min: 98.1 °F (36.7 °C)   Max: 98.5 °F (36.9 °C)  Pulse  Min: 67  Max: 96  BP  Min: 129/62  Max: 175/80  MAP (mmHg)  Min: 89  Max: 129  Resp  Min: 17  Max: 20  SpO2  Min: 95 %  Max: 99 %    06/08 0701 - 06/09 0700  In: 2715 [P.O.:840; I.V.:1875]  Out: 2930 [Urine:2860; Drains:70]   Unmeasured Output  Urine Occurrence: 1  Stool Occurrence: 0  Emesis Occurrence: 0  Pad Count: 1     Examination:   Constitutional: Well-nourished and -developed. No apparent distress.   Eyes: Conjunctiva clear, anicteric. Lids no lesions.  Head/Ears/Nose/Mouth/Throat/Neck: SG drain in place. Moist mucous membranes. External ears, nose atraumatic.   Cardiovascular: Regular rhythm. No murmurs. No leg edema.  Respiratory: Comfortable respirations. Clear to auscultation.  Gastrointestinal: No hernia. Soft, nondistended, nontender. + bowel sounds.    Neurologic:  -GCS [id number]  -Alert. Oriented to person, place, and time. Speech fluent. Follows commands.  -Cranial nerves intact  -Motor no focal deficits  -Sensation intact    Medications:   Continuous  sodium chloride 0.9% Last Rate: 75 mL/hr at 06/08/18 1800   Scheduled  amLODIPine 5 mg QHS   DULoxetine 30 mg Daily   hydrocortisone 10 mg QHS   hydrocortisone 20 mg QAM   lisinopril 20 mg Daily   pantoprazole 40 mg Daily   PRN  fentaNYL 25 mcg Q1H PRN   hydrALAZINE 20 mg Q6H PRN   labetalol 10 mg Q10 Min PRN   oxyCODONE-acetaminophen 1 tablet Q4H PRN      Today I independently reviewed pertinent medications, lines/drains/airways, lab results,     Assessment/Plan:     Cardiac/Vascular   Essential hypertension    -SBP < 160  -Continue home meds of Lisinopril 20 mg and Amlodipine 5 mg daily         Endocrine   * Pituitary mass    POD 3 s/p L frontotemporal craniotomy for excision of pituitary tumor  -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  -NSGY following   -Subgaleal drain in place  -Ancef while drain in place  -Follow up CT head 6/7/18 with intraventricular hemorrhage with mild  distension of the lateral ventricles concerning for developing hydrocephalus; repeat scan stable   --160   -hydrocortisone per NSGY  -Monitoring for DI                Prophylaxis:  Venous Thromboembolism: mechanical  Stress Ulcer: PPI while on steroids   Ventilator Pneumonia: not applicable     Activity Orders          Up with assistance starting at 06/07 0600        No Order    Sybil Purcell PA-C  Neurocritical Care  [facility]

## 2018-06-09 NOTE — PLAN OF CARE
Problem: Physical Therapy Goal  Goal: Physical Therapy Goal  Goals to be met by: 18    Patient will increase functional independence with mobility by performin. Supine <> sit with Mitchell  2. Sit to stand transfer with Modified Mitchell  3. Gait  x 150 feet with Supervision with or without least restrictive AD.   4. Stand for 3 minutes with Supervision while performing dynamic balance activities to reduce fall risk   5. Lower extremity exercise program x20 reps per handout, with supervision    Outcome: Ongoing (interventions implemented as appropriate)  PT evaluation completed- see note for details. Goals established and POC initiated.     Chiquita Valencia, PT, DPT   2018  Pager: [phone]

## 2018-06-09 NOTE — PROGRESS NOTES
[facility]  Neurosurgery  Progress Note    Subjective:     History of Present Illness: No notes on file    Post-Op Info:  Procedure(s) (LRB):  CRANIOTOMY, USING FRAMELESS STEREOTAXY (STEALTH) (Left)   2 Days Post-Op     Interval History: no AE. AFVSS. Improving mental stauts    Medications:  Continuous Infusions:   sodium chloride 0.9% 75 mL/hr at 06/08/18 1800     Scheduled Meds:   amLODIPine  5 mg Oral QHS    DULoxetine  30 mg Oral Daily    hydrocortisone  10 mg Oral QHS    hydrocortisone  20 mg Oral QAM    lisinopril  20 mg Oral Daily    pantoprazole  40 mg Oral Daily     PRN Meds:fentaNYL, hydrALAZINE, labetalol, oxyCODONE-acetaminophen     Review of Systems    Objective:     Weight: 71.8 kg (158 lb 4.6 oz)  Body mass index is 27.17 kg/m².  Vital Signs (Most Recent):  Temp: 98.4 °F (36.9 °C) (06/08/18 1501)  Pulse: 85 (06/08/18 1800)  Resp: 18 (06/08/18 1800)  BP: 132/69 (06/08/18 1800)  SpO2: 98 % (06/08/18 1800) Vital Signs (24h Range):  Temp:  [98.2 °F (36.8 °C)-98.8 °F (37.1 °C)] 98.4 °F (36.9 °C)  Pulse:  [78-93] 85  Resp:  [16-20] 18  SpO2:  [95 %-99 %] 98 %  BP: (114-161)/() 132/69  Arterial Line BP: (114-158)/(49-85) 127/51       Date 06/08/18 0700 - 06/09/18 0659   Shift 1279-6057 5479-5335 5940-3502 24 Hour Total   I  N  T  A  K  E   P.O. 480 360  840    I.V.  (mL/kg) 600  (8.4) 300  (4.2)  900  (12.5)    Shift Total  (mL/kg) 1080  (15) 660  (9.2)  1740  (24.2)   O  U  T  P  U  T   Urine  (mL/kg/hr) 725  (1.3) 390  1115    Drains 30 30  60    Shift Total  (mL/kg) 755  (10.5) 420  (5.8)  1175  (16.4)   Weight (kg) 71.8 71.8 71.8 71.8            Closed/Suction Drain 06/06/18 1333 Right Other (Comment) Accordion 10 Fr. (Active)   Site Description Unable to view 6/7/2018  3:05 PM   Dressing Type Gauze 6/7/2018  3:05 PM   Dressing Status Clean;Dry;Intact 6/7/2018  3:05 PM   Drainage Serosanguineous 6/7/2018  3:05 PM   Status Other (Comment) 6/7/2018  3:05 PM   Output (mL) 70  "mL 6/7/2018  5:00 AM            Urethral Catheter 06/06/18 0835 Non-latex 16 Fr. (Active)   Site Assessment Clean;Intact 6/7/2018  3:05 PM   Collection Container Urimeter 6/7/2018  3:05 PM   Securement Method secured to top of thigh w/ adhesive device 6/7/2018  3:05 PM   Catheter Care Performed yes 6/7/2018  7:04 AM   Reason for Continuing Urinary Catheterization Post operative 6/7/2018  3:05 PM   CAUTI Prevention Bundle StatLock in place w 1" slack;Intact seal between catheter & drainage tubing;Drainage bag off the floor;Green sheeting clip in use;No dependent loops or kinks;Drainage bag not overfilled (<2/3 full);Drainage bag below bladder 6/7/2018  7:04 AM   Output (mL) 30 mL 6/7/2018  2:04 PM       Physical Exam:    Neurological:   AAOx3, NAD  Speech fluent repeats well. Answers questions appropriately  PERRL  Light perception present R eye. Patient correctly identifies examiners hair color. States she can see my face       Awake, alert, oriented x2.   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  FS TM EOMI.    Significant Labs:    Recent Labs  Lab 06/07/18  0305 06/08/18  0211   * 129*    145   K 3.5 3.5    112*   CO2 25 25   BUN 9 9   CREATININE 0.7 0.7   CALCIUM 8.2* 8.1*   MG 2.0 2.3       Recent Labs  Lab 06/07/18  0305 06/08/18  0211   WBC 13.51* 14.60*   HGB 11.0* 10.1*   HCT 34.3* 31.6*    182     No results for input(s): LABPT, INR, APTT in the last 48 hours.  Microbiology Results (last 7 days)     ** No results found for the last 168 hours. **        All pertinent labs from the last 24 hours have been reviewed.    Significant Diagnostics:  I have reviewed all pertinent imaging results/findings within the past 24 hours.    Assessment/Plan:     * Pituitary mass    83F with likely pituitary adenoma now s/p crani for resection.    Neuro stable  -- Drain to remain, output: 255  -- Ancef while drain in place.    20am 10pm    Monitor for DI, strict IOs  -- Pain control.  -- " Zofran for nausea prn.  -- Regular diet.  -- Vitals per unit routine  -- PT/OT daily and for recs. Patient OOB >4h/day.  -- PPx: PPI, SQH, IS, SCDs, bowel regimen  -- Dispo: cont ICU care                Alden Lim MD  Neurosurgery  [facility]

## 2018-06-09 NOTE — SUBJECTIVE & OBJECTIVE
Interval History: no AE. AFVSS. Improving mental stauts    Medications:  Continuous Infusions:   sodium chloride 0.9% 75 mL/hr at 06/08/18 1800     Scheduled Meds:   amLODIPine  5 mg Oral QHS    DULoxetine  30 mg Oral Daily    hydrocortisone  10 mg Oral QHS    hydrocortisone  20 mg Oral QAM    lisinopril  20 mg Oral Daily    pantoprazole  40 mg Oral Daily     PRN Meds:fentaNYL, hydrALAZINE, labetalol, oxyCODONE-acetaminophen     Review of Systems    Objective:     Weight: 71.8 kg (158 lb 4.6 oz)  Body mass index is 27.17 kg/m².  Vital Signs (Most Recent):  Temp: 98.4 °F (36.9 °C) (06/08/18 1501)  Pulse: 85 (06/08/18 1800)  Resp: 18 (06/08/18 1800)  BP: 132/69 (06/08/18 1800)  SpO2: 98 % (06/08/18 1800) Vital Signs (24h Range):  Temp:  [98.2 °F (36.8 °C)-98.8 °F (37.1 °C)] 98.4 °F (36.9 °C)  Pulse:  [78-93] 85  Resp:  [16-20] 18  SpO2:  [95 %-99 %] 98 %  BP: (114-161)/() 132/69  Arterial Line BP: (114-158)/(49-85) 127/51       Date 06/08/18 0700 - 06/09/18 0659   Shift 6237-0587 0590-6860 0977-9338 24 Hour Total   I  N  T  A  K  E   P.O. 480 360  840    I.V.  (mL/kg) 600  (8.4) 300  (4.2)  900  (12.5)    Shift Total  (mL/kg) 1080  (15) 660  (9.2)  1740  (24.2)   O  U  T  P  U  T   Urine  (mL/kg/hr) 725  (1.3) 390  1115    Drains 30 30  60    Shift Total  (mL/kg) 755  (10.5) 420  (5.8)  1175  (16.4)   Weight (kg) 71.8 71.8 71.8 71.8            Closed/Suction Drain 06/06/18 1333 Right Other (Comment) Accordion 10 Fr. (Active)   Site Description Unable to view 6/7/2018  3:05 PM   Dressing Type Gauze 6/7/2018  3:05 PM   Dressing Status Clean;Dry;Intact 6/7/2018  3:05 PM   Drainage Serosanguineous 6/7/2018  3:05 PM   Status Other (Comment) 6/7/2018  3:05 PM   Output (mL) 70 mL 6/7/2018  5:00 AM            Urethral Catheter 06/06/18 0835 Non-latex 16 Fr. (Active)   Site Assessment Clean;Intact 6/7/2018  3:05 PM   Collection Container Urimeter 6/7/2018  3:05 PM   Securement Method secured to top of thigh w/  "adhesive device 6/7/2018  3:05 PM   Catheter Care Performed yes 6/7/2018  7:04 AM   Reason for Continuing Urinary Catheterization Post operative 6/7/2018  3:05 PM   CAUTI Prevention Bundle StatLock in place w 1" slack;Intact seal between catheter & drainage tubing;Drainage bag off the floor;Green sheeting clip in use;No dependent loops or kinks;Drainage bag not overfilled (<2/3 full);Drainage bag below bladder 6/7/2018  7:04 AM   Output (mL) 30 mL 6/7/2018  2:04 PM       Physical Exam:    Neurological:   AAOx3, NAD  Speech fluent repeats well. Answers questions appropriately  PERRL  Light perception present R eye. Patient correctly identifies examiners hair color. States she can see my face       Awake, alert, oriented x2.   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  FS TM EOMI.    Significant Labs:    Recent Labs  Lab 06/07/18  0305 06/08/18  0211   * 129*    145   K 3.5 3.5    112*   CO2 25 25   BUN 9 9   CREATININE 0.7 0.7   CALCIUM 8.2* 8.1*   MG 2.0 2.3       Recent Labs  Lab 06/07/18  0305 06/08/18  0211   WBC 13.51* 14.60*   HGB 11.0* 10.1*   HCT 34.3* 31.6*    182     No results for input(s): LABPT, INR, APTT in the last 48 hours.  Microbiology Results (last 7 days)     ** No results found for the last 168 hours. **        All pertinent labs from the last 24 hours have been reviewed.    Significant Diagnostics:  I have reviewed all pertinent imaging results/findings within the past 24 hours.  "

## 2018-06-09 NOTE — PT/OT/SLP EVAL
Occupational Therapy   Evaluation    Name: Florence Everett  MRN: [id number]  Admitting Diagnosis:  Pituitary mass 3 Days Post-Op    Recommendations:     Discharge Recommendations:  (tbd pending further mobility assessment )  Discharge Equipment Recommendations:   (tbd)  Barriers to discharge:  Decreased caregiver support    History:     Occupational Profile:  Living Environment: Pt lives in a h w/ spouse w/ TH to enter.  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  Equipment Owned:  none  Assistance upon Discharge: Pt has assistance upon D/C.     Past Medical History:   Diagnosis Date    Cataract     Hyperlipidemia     Hypertension     Neuromuscular disorder        Past Surgical History:   Procedure Laterality Date    CRANIOTOMY USING FRAMELESS STEREOTAXY Left 6/6/2018    Procedure: CRANIOTOMY, USING FRAMELESS STEREOTAXY (STEALTH);  Surgeon: Keo Shaffer MD;  Location: [facility] OR [address] [place] [address];  Service: Neurosurgery;  Laterality: Left;    HYSTERECTOMY         Subjective     Chief Complaint: No complaints  Patient/Family stated goals: Return to PLOF  Communicated with: RN prior to session.  Pain/Comfort:  · Pain Rating 1: 0/10  · Pain Rating Post-Intervention 1: 0/10    Patients cultural, spiritual, Adventism conflicts given the current situation:      Objective:     Patient found with: arterial line, blood pressure cuff, telemetry, pulse ox (continuous), peripheral IV, marie catheter, hemovac    General Precautions: Standard, fall   Orthopedic Precautions:N/A   Braces: N/A     Occupational Performance:    Bed Mobility:    · Patient completed Scooting/Bridging with contact guard assistance  · Patient completed Supine to Sit with contact guard assistance    Functional Mobility/Transfers:  · Patient completed Sit <> Stand Transfer with contact guard assistance  with  no assistive device   · Patient completed Bed <> Chair Transfer  using Stand Pivot technique with contact guard assistance with no assistive device  · Functional Mobility: Pt ambulated ~3 ft at [facility] w/o AD. Ambulation limited 2* lines    Activities of Daily Living:  · LB Dressing: stand by assistance donned/doffed socks seated EOB    Cognitive/Visual Perceptual:  Cognitive/Psychosocial Skills:     -       Oriented to: Person, Place, Time and Situation   -       Follows Commands/attention:Follows multistep  commands  -       Communication: clear/fluent  -       Memory: No Deficits noted  -       Safety awareness/insight to disability: intact   -       Mood/Affect/Coping skills/emotional control: Appropriate to situation  Visual/Perceptual:      -Intact    Physical Exam:  Balance:    -       Pt displayed fair to good overall balance   Postural examination/scapula alignment:    -       Rounded shoulders  Skin integrity: Visible skin intact  Upper Extremity Range of Motion:  -       Right Upper Extremity: WFL  -       Left Upper Extremity: WFL  Upper Extremity Strength:    -       Right Upper Extremity: WFL  -       Left Upper Extremity: WFL   Strength:    -       Right Upper Extremity: WFL  -       Left Upper Extremity: WFL  Fine Motor Coordination:    -       Impaired  Right hand, finger to nose overshooting at midline and and to Pt's R  Gross motor coordination:   WFL    Patient left up in chair with all lines intact, call button in reach, RN notified and daughter present    [facility] 6 Click:  AMPA Total Score: 20    Treatment & Education:  Pt and pt's daughter was educated on POC.    Education:    Assessment:     Florence Everett is a 83 y.o. female with a medical diagnosis of Pituitary mass.  She presents with impairments listed below. Pt displayed goo overall physical functioning. Pt displayed R hand impaired coordination, causing pt to possibly have deficits for ADLs and B/L hand tasks. Unable to currently make a D/C rec 2* limited ambulation. Ambulation must be further  "addressed for safety w/ visual deficits. Pt would benefit from skilled OT services to improve independence and overall occupational functioning.      Performance deficits affecting function are weakness, impaired endurance, impaired self care skills, impaired functional mobilty, gait instability, impaired balance, decreased lower extremity function, decreased upper extremity function, impaired coordination.      Rehab Prognosis:  good; patient would benefit from acute skilled OT services to address these deficits and reach maximum level of function.         Clinical Decision Makin.  OT Low:  "Pt evaluation falls under low complexity for evaluation coding due to performance deficits noted in 1-3 areas as stated above and 0 co-morbities affecting current functional status. Data obtained from problem focused assessments. No modifications or assistance was required for completion of evaluation. Only brief occupational profile and history review completed."     Plan:     Patient to be seen 4 x/week to address the above listed problems via self-care/home management, therapeutic activities, therapeutic exercises, neuromuscular re-education, community/work re-entry  · Plan of Care Expires: 18  · Plan of Care Reviewed with: patient, daughter    This Plan of care has been discussed with the patient who was involved in its development and understands and is in agreement with the identified goals and treatment plan    GOALS:    Occupational Therapy Goals        Problem: Occupational Therapy Goal    Goal Priority Disciplines Outcome Interventions   Occupational Therapy Goal     OT, PT/OT     Description:  Goals to be met by: 2018     Patient will increase functional independence with ADLs by performing:    UE Dressing with [place].  LE Dressing with [place].  Grooming while seated at sink with Supervision.  Toileting from toilet with Minimal Assistance for hygiene and clothing management.   Toilet " transfer to toilet with Stand-by Assistance.                      Time Tracking:     OT Date of Treatment: 06/09/18  OT Start Time: 0759  OT Stop Time: 0821  OT Total Time (min): 22 min    Billable Minutes:Evaluation 22 minutes    Pio Yo, OT  6/9/2018

## 2018-06-09 NOTE — PLAN OF CARE
Problem: Occupational Therapy Goal  Goal: Occupational Therapy Goal  Goals to be met by: 6/16/2018     Patient will increase functional independence with ADLs by performing:    UE Dressing with [place].  LE Dressing with [place].  Grooming while seated at sink with Supervision.  Toileting from toilet with Minimal Assistance for hygiene and clothing management.   Toilet transfer to toilet with Stand-by Assistance.    Initiate OT POC     Comments: Pio Yo OTR/L  6/9/2018

## 2018-06-09 NOTE — ASSESSMENT & PLAN NOTE
83F with likely pituitary adenoma now s/p crani for resection.    Neuro stable  -- Drain to remain, output: 255  -- Ancef while drain in place.    20am 10pm    Monitor for DI, strict IOs  -- Pain control.  -- Zofran for nausea prn.  -- Regular diet.  -- Vitals per unit routine  -- PT/OT daily and for recs. Patient OOB >4h/day.  -- PPx: PPI, SQH, IS, SCDs, bowel regimen  -- Dispo: cont ICU care

## 2018-06-09 NOTE — ASSESSMENT & PLAN NOTE
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

## 2018-06-09 NOTE — PT/OT/SLP EVAL
Physical Therapy Evaluation    Patient Name:  Florence Everett   MRN:  [id number]    Recommendations:     Discharge Recommendations:   (Rehab vs. HH pending further gait and balance assessment )   Discharge Equipment Recommendations:  (TBD)   Barriers to discharge: None    Assessment:     Florence Everett is a 83 y.o. female admitted with a medical diagnosis of Pituitary mass, s/p craniotomy with resection of tumor.  She presents with the following impairments/functional limitations:  impaired endurance, impaired functional mobilty, impaired self care skills, gait instability, impaired balance, visual deficits. Gait and dynamic balance assessment limited 2/2 pt in NCC setting- would benefit from further assessment at next visit to evaluate impact of visual deficits on stability and functional mobility. Prior to admit, pt was independent with mobility and ADLs, and serving as caregiver to . Pt's functional deficits impair ability to participate in ADLs and mobility; Pt would benefit from skilled PT intervention to address listed deficits, reduce fall risk, and maximize (I) and safety with functional mobility. Discharge recommendations pending further assessment.     Rehab Prognosis:  good; patient would benefit from acute skilled PT services to address these deficits and reach maximum level of function.      Recent Surgery: Procedure(s) (LRB):  CRANIOTOMY, USING FRAMELESS STEREOTAXY (STEALTH) (Left) 3 Days Post-Op    Plan:     During this hospitalization, patient to be seen 4 x/week to address the above listed problems via gait training, therapeutic activities, therapeutic exercises, neuromuscular re-education  · Plan of Care Expires:  07/09/18   Plan of Care Reviewed with: patient, daughter    Subjective     Communicated with RN prior to session.  Patient found supine with daughter at bedside upon PT entry to room. Pt alert and agreeable to evaluation.      Chief Complaint: impaired vision   Patient  "comments/goals: return home  Pain/Comfort:  · Pain Rating 1: 0/10  · Pain Rating Post-Intervention 1: 0/10    Patients cultural, spiritual, Jew conflicts given the current situation: no conflicts    Patient History:     Living Environment: pt lives with  in [facility] with threshold to enter; bathroom has walk-in shower.   Prior Level of Function: independent with mobility and ADLs   DME owned: [employer], RW   Caregiver Assistance: assistance from family available per daughter report     Additional roles/responsibilities:   · Driving: yes  · Caregiver for       Objective:     Patient found with: arterial line, blood pressure cuff, marie catheter, pulse ox (continuous), peripheral IV, hemovac, telemetry, oxygen     General Precautions: Standard, fall   Orthopedic Precautions:N/A   Braces: N/A     Exams:  · Cognitive Exam:  Patient is oriented to Person, Place, Time and Situation; able to follow commands   · Postural Exam:  Patient presented with the following abnormalities:    · -       Rounded shoulders  · Sensation:    · -       Intact  light/touch BLEs  · RLE ROM: WFL  · RLE Strength: 5/5  · LLE ROM: WFL  · LLE Strength: 5/5  · Vision: visual tracking intact bilaterally, impaired peripheral vision bilaterally; pt reported blurry vision and seeing all colors as "blues and whites"- reporting therapists scrubs were blue instead of maroon/wine color. Pt also reported difficulty with facial recognition.     Functional Mobility:       Bed Mobility    · Supine to sit: CGA with use of handrail     · Sit to supine: N/T       Transfers · Sit <> Stand: CGA from EOB x 1 trial with no AD; CGA from bedside chair x 1 trial with no AD        Gait  · Distance: ~4 ft with 180 degree turn to chair   · Assistance level: no AD, CGA for safety, balance and direction 2/2 visual deficits    · Gait Deviations: decreased step length, mild instability noted, decreased toe-to-floor clearance             AM-PAC 6 CLICK MOBILITY  Total " Score:16       Therapeutic Activities and Exercises:  Pt and daughter educated on: role of PT and POC/goals for therapy as well as safety with mobility. Pt verbalized understanding and expressed no further concerns/questions.     Patient left up in chair with all lines intact, call button in reach, RN notified and daughter present.    GOALS:    Physical Therapy Goals        Problem: Physical Therapy Goal    Goal Priority Disciplines Outcome Goal Variances Interventions   Physical Therapy Goal     PT/OT, PT Ongoing (interventions implemented as appropriate)     Description:  Goals to be met by: 18    Patient will increase functional independence with mobility by performin. Supine <> sit with [place]  2. Sit to stand transfer with Modified [place]  3. Gait  x 150 feet with Supervision with or without least restrictive AD.   4. Stand for 3 minutes with Supervision while performing dynamic balance activities to reduce fall risk   5. Lower extremity exercise program x20 reps per handout, with supervision                      History:     Past Medical History:   Diagnosis Date    Cataract     Hyperlipidemia     Hypertension     Neuromuscular disorder        Past Surgical History:   Procedure Laterality Date    CRANIOTOMY USING FRAMELESS STEREOTAXY Left 2018    Procedure: CRANIOTOMY, USING FRAMELESS STEREOTAXY (STEALTH);  Surgeon: Keo Shaffer MD;  Location: [facility] OR [address] [place] [address];  Service: Neurosurgery;  Laterality: Left;    HYSTERECTOMY         Clinical Decision Making:     Moderate complexity evaluation:   · Evolving clinical presentation   · 1-2 personal factors/comorbidities affecting treatment   · Examining and addressing 3+ functional deficits, activity limitations, and participation restrictions    Time Tracking:     PT Received On: 18  PT Start Time: 0759     PT Stop Time: 08  PT Total Time (min): 22 min     Billable Minutes: Evaluation 22 min (Co-eval with OT)      Chiquita  Erik PT, DPT   6/9/2018  Pager: [phone]

## 2018-06-10 PROBLEM — I61.5 IVH (INTRAVENTRICULAR HEMORRHAGE): Status: ACTIVE | Noted: 2018-06-10

## 2018-06-10 LAB
ACANTHOCYTES BLD QL SMEAR: ABNORMAL
ANION GAP SERPL CALC-SCNC: 10 MMOL/L
ANISOCYTOSIS BLD QL SMEAR: ABNORMAL
AUER BODIES BLD QL SMEAR: ABNORMAL
BASO STIPL BLD QL SMEAR: ABNORMAL
BASOPHILS # BLD AUTO: 0.05 K/UL
BASOPHILS # BLD AUTO: ABNORMAL K/UL
BASOPHILS NFR BLD: 0.5 %
BASOPHILS NFR BLD: ABNORMAL %
BLASTS NFR BLD MANUAL: ABNORMAL %
BLD PROD TYP BPU: NORMAL
BLD PROD TYP BPU: NORMAL
BLOOD UNIT EXPIRATION DATE: NORMAL
BLOOD UNIT EXPIRATION DATE: NORMAL
BLOOD UNIT TYPE CODE: 5100
BLOOD UNIT TYPE CODE: 5100
BLOOD UNIT TYPE: NORMAL
BLOOD UNIT TYPE: NORMAL
BUN SERPL-MCNC: 7 MG/DL
BURR CELLS BLD QL SMEAR: ABNORMAL
CABOT RINGS BLD QL SMEAR: ABNORMAL
CALCIUM SERPL-MCNC: 8.4 MG/DL
CHLORIDE SERPL-SCNC: 106 MMOL/L
CO2 SERPL-SCNC: 26 MMOL/L
CODING SYSTEM: NORMAL
CODING SYSTEM: NORMAL
CREAT SERPL-MCNC: 0.7 MG/DL
DACRYOCYTES BLD QL SMEAR: ABNORMAL
DIFFERENTIAL METHOD: ABNORMAL
DIFFERENTIAL METHOD: ABNORMAL
DISPENSE STATUS: NORMAL
DISPENSE STATUS: NORMAL
DOHLE BOD BLD QL SMEAR: ABNORMAL
EOSINOPHIL # BLD AUTO: 0 K/UL
EOSINOPHIL # BLD AUTO: ABNORMAL K/UL
EOSINOPHIL NFR BLD: 0.3 %
EOSINOPHIL NFR BLD: ABNORMAL %
ERYTHROCYTE [DISTWIDTH] IN BLOOD BY AUTOMATED COUNT: 14.2 %
ERYTHROCYTE [DISTWIDTH] IN BLOOD BY AUTOMATED COUNT: ABNORMAL %
EST. GFR  (AFRICAN AMERICAN): >60 ML/MIN/1.73 M^2
EST. GFR  (NON AFRICAN AMERICAN): >60 ML/MIN/1.73 M^2
GIANT PLATELETS BLD QL SMEAR: ABNORMAL
GLUCOSE SERPL-MCNC: 113 MG/DL
HCT VFR BLD AUTO: 33.8 %
HCT VFR BLD AUTO: ABNORMAL %
HEINZ BOD BLD QL SMEAR: ABNORMAL
HGB BLD-MCNC: 11 G/DL
HGB BLD-MCNC: ABNORMAL G/DL
HGB C CRY RBC QL MICRO: ABNORMAL
HOWELL-JOLLY BOD BLD QL SMEAR: ABNORMAL
HYPOCHROMIA BLD QL SMEAR: ABNORMAL
IMM GRANULOCYTES # BLD AUTO: 0.24 K/UL
IMM GRANULOCYTES # BLD AUTO: ABNORMAL K/UL
IMM GRANULOCYTES NFR BLD AUTO: 2.2 %
IMM GRANULOCYTES NFR BLD AUTO: ABNORMAL %
LYMPHOCYTES # BLD AUTO: 1.9 K/UL
LYMPHOCYTES # BLD AUTO: ABNORMAL K/UL
LYMPHOCYTES NFR BLD: 17.3 %
LYMPHOCYTES NFR BLD: ABNORMAL %
MAGNESIUM SERPL-MCNC: 2 MG/DL
MCH RBC QN AUTO: 30.6 PG
MCH RBC QN AUTO: ABNORMAL PG
MCHC RBC AUTO-ENTMCNC: 32.5 G/DL
MCHC RBC AUTO-ENTMCNC: ABNORMAL G/DL
MCV RBC AUTO: 94 FL
MCV RBC AUTO: ABNORMAL FL
MEGAKARYOCYTIC FRAGMENTS: ABNORMAL
METAMYELOCYTES NFR BLD MANUAL: ABNORMAL %
MONOCYTES # BLD AUTO: 1.4 K/UL
MONOCYTES # BLD AUTO: ABNORMAL K/UL
MONOCYTES NFR BLD: 12.4 %
MONOCYTES NFR BLD: ABNORMAL %
MYELOCYTES NFR BLD MANUAL: ABNORMAL %
NEUTROPHILS # BLD AUTO: 7.4 K/UL
NEUTROPHILS # BLD AUTO: ABNORMAL K/UL
NEUTROPHILS NFR BLD: 67.3 %
NEUTROPHILS NFR BLD: ABNORMAL %
NEUTS BAND NFR BLD MANUAL: ABNORMAL %
NRBC BLD-RTO: 0 /100 WBC
NRBC BLD-RTO: ABNORMAL /100 WBC
OVALOCYTES BLD QL SMEAR: ABNORMAL
PAPPENHEIMER BOD BLD QL SMEAR: ABNORMAL
PLASMODIUM BLD QL SMEAR: ABNORMAL
PLATELET # BLD AUTO: 189 K/UL
PLATELET # BLD AUTO: ABNORMAL K/UL
PLATELET BLD QL SMEAR: ABNORMAL
PMV BLD AUTO: 11.2 FL
PMV BLD AUTO: ABNORMAL FL
POIKILOCYTOSIS BLD QL SMEAR: ABNORMAL
POLYCHROMASIA BLD QL SMEAR: ABNORMAL
POTASSIUM SERPL-SCNC: 3.4 MMOL/L
PROMYELOCYTES NFR BLD MANUAL: ABNORMAL %
RBC # BLD AUTO: 3.6 M/UL
RBC # BLD AUTO: ABNORMAL M/UL
RBC AGGLUT BLD QL: ABNORMAL
ROULEAUX BLD QL SMEAR: ABNORMAL
SCHISTOCYTES BLD QL SMEAR: ABNORMAL
SCHISTOCYTES BLD QL SMEAR: ABNORMAL
SICKLE CELLS BLD QL SMEAR: ABNORMAL
SMUDGE CELLS BLD QL SMEAR: ABNORMAL
SODIUM SERPL-SCNC: 142 MMOL/L
SPHEROCYTES BLD QL SMEAR: ABNORMAL
STOMATOCYTES BLD QL SMEAR: ABNORMAL
TARGETS BLD QL SMEAR: ABNORMAL
TOXIC GRANULES BLD QL SMEAR: ABNORMAL
TRANS ERYTHROCYTES VOL PATIENT: NORMAL ML
TRANS ERYTHROCYTES VOL PATIENT: NORMAL ML
WBC # BLD AUTO: 10.92 K/UL
WBC # BLD AUTO: 11.02 K/UL
WBC NRBC COR # BLD: ABNORMAL K/UL
WBC OTHER NFR BLD MANUAL: ABNORMAL %
WBC TOXIC VACUOLES BLD QL SMEAR: ABNORMAL

## 2018-06-10 PROCEDURE — 27000221 HC OXYGEN, UP TO 24 HOURS

## 2018-06-10 PROCEDURE — 25000003 PHARM REV CODE 250: Performed by: STUDENT IN AN ORGANIZED HEALTH CARE EDUCATION/TRAINING PROGRAM

## 2018-06-10 PROCEDURE — 80048 BASIC METABOLIC PNL TOTAL CA: CPT

## 2018-06-10 PROCEDURE — 20000000 HC ICU ROOM

## 2018-06-10 PROCEDURE — 83735 ASSAY OF MAGNESIUM: CPT

## 2018-06-10 PROCEDURE — 99233 SBSQ HOSP IP/OBS HIGH 50: CPT | Mod: ,,, | Performed by: PHYSICIAN ASSISTANT

## 2018-06-10 PROCEDURE — 85025 COMPLETE CBC W/AUTO DIFF WBC: CPT

## 2018-06-10 PROCEDURE — 94761 N-INVAS EAR/PLS OXIMETRY MLT: CPT

## 2018-06-10 PROCEDURE — 63600175 PHARM REV CODE 636 W HCPCS: Performed by: STUDENT IN AN ORGANIZED HEALTH CARE EDUCATION/TRAINING PROGRAM

## 2018-06-10 PROCEDURE — 25000003 PHARM REV CODE 250: Performed by: PHYSICIAN ASSISTANT

## 2018-06-10 RX ORDER — AMLODIPINE BESYLATE 10 MG/1
10 TABLET ORAL NIGHTLY
Status: DISCONTINUED | OUTPATIENT
Start: 2018-06-10 | End: 2018-06-15 | Stop reason: HOSPADM

## 2018-06-10 RX ORDER — LIDOCAINE HYDROCHLORIDE AND EPINEPHRINE 10; 10 MG/ML; UG/ML
10 INJECTION, SOLUTION INFILTRATION; PERINEURAL ONCE
Status: COMPLETED | OUTPATIENT
Start: 2018-06-10 | End: 2018-06-10

## 2018-06-10 RX ADMIN — DULOXETINE 30 MG: 30 CAPSULE, DELAYED RELEASE ORAL at 09:06

## 2018-06-10 RX ADMIN — PANTOPRAZOLE SODIUM 40 MG: 40 TABLET, DELAYED RELEASE ORAL at 09:06

## 2018-06-10 RX ADMIN — HYDRALAZINE HYDROCHLORIDE 20 MG: 20 INJECTION INTRAMUSCULAR; INTRAVENOUS at 05:06

## 2018-06-10 RX ADMIN — HYDRALAZINE HYDROCHLORIDE 20 MG: 20 INJECTION INTRAMUSCULAR; INTRAVENOUS at 04:06

## 2018-06-10 RX ADMIN — LISINOPRIL 20 MG: 20 TABLET ORAL at 09:06

## 2018-06-10 RX ADMIN — HYDROCORTISONE 20 MG: 10 TABLET ORAL at 06:06

## 2018-06-10 RX ADMIN — LIDOCAINE HYDROCHLORIDE AND EPINEPHRINE 10 ML: 10; 10 INJECTION, SOLUTION INFILTRATION; PERINEURAL at 04:06

## 2018-06-10 RX ADMIN — AMLODIPINE BESYLATE 10 MG: 10 TABLET ORAL at 08:06

## 2018-06-10 RX ADMIN — HYDROCORTISONE 10 MG: 10 TABLET ORAL at 08:06

## 2018-06-10 NOTE — PROGRESS NOTES
Pt returned from CT scan via bed jonas CT Scan well denies nausea  Neuro Surgery at bedside  Stated will eval CT results

## 2018-06-10 NOTE — PLAN OF CARE
Problem: Patient Care Overview  Goal: Plan of Care Review  Outcome: Ongoing (interventions implemented as appropriate)  POC reviewed with pt at 0500. Pt verbalized understanding. Questions and concerns addressed. No acute events overnight. Drain pulled by MD Loreta. Blood pressure requiring PRN's. Pt progressing toward goals. Will continue to monitor. See flowsheets for full assessment and VS info

## 2018-06-10 NOTE — PROGRESS NOTES
[facility]  Neurocritical Care  Progress Note    Admit Date: 6/6/2018  Service Date: 06/10/2018  Length of Stay: 4    Subjective:     Chief Complaint: Pituitary mass    History of Present Illness: Patient is a 83 year old Female of HTN who is s/p left frontotemporal craniotomy with excision of pituitary tumor with neuronavigation and microsurgery. Patient presented to NSGY clinic 5/29/18 endorsing decreased vision in her left eye for the past year. In March, she suffered a fall without LOC and had extensive facial injuries. A CT of the brain was performed that showed a pituitary tumor. MRI brain then was performed that showed a large pituitary tumor with considerable suprasellar extension. On her physical exam at the time of clinic visit, she was noted to have full ocular movements, bitemporal hemianopsia and vision decreased in her left eye to barely counting. Decision was made for excision of the adenoma.   Patient is s/p excision of tumor 6/6/18. She will be admitted to [facility] for higher level of care.     Hospital Course: 6/6: s/p left frontotemporal craniotomy with excision of pituitary tumor with neuronavigation and microsurgery; admitted to [facility]   6/7: Follow up CT head with blood in ventricles; Follow up scan scheduled today  6/8: Patient more awake and alert than prior   6/10: SG drain pulled in AM. Vomited in afternoon, CTH stable     Interval History: SG drain removed this AM by NSGY. Episode of vomiting this afternoon, CTH obtained after without signs of hydro, IVH improved. Vision stable     Review of Systems: Reports nausea and one episode of vomiting. Denies confusion, HA, abdominal pain, focal weakness, speech difficulty, chest pain or shortness of breath.    Vitals:   Temp: 98.3 °F (36.8 °C)  Pulse: 88  Rhythm: normal sinus rhythm  BP: 138/65  MAP (mmHg): 118  Resp: 18  SpO2: 96 %  O2 Device (Oxygen Therapy): nasal cannula    Temp  Min: 98.1 °F (36.7 °C)  Max: 98.6 °F (37 °C)  Pulse   Min: 71  Max: 94  BP  Min: 127/77  Max: 174/78  MAP (mmHg)  Min: 96  Max: 118  Resp  Min: 17  Max: 20  SpO2  Min: 96 %  Max: 100 %    06/09 0701 - 06/10 0700  In: 2072.5 [P.O.:300; I.V.:1772.5]  Out: 3060 [Urine:3050; Drains:10]   Unmeasured Output  Urine Occurrence: 1  Stool Occurrence: 1  Emesis Occurrence: 0  Pad Count: 1     Examination:   Constitutional: Well-nourished and -developed. No apparent distress.   Eyes: Conjunctiva clear, anicteric. Lids no lesions.  Head/Ears/Nose/Mouth/Throat/Neck: SG drain in place. Moist mucous membranes. External ears, nose atraumatic.   Cardiovascular: Regular rhythm. No murmurs. No leg edema.  Respiratory: Comfortable respirations. Clear to auscultation.  Gastrointestinal: No hernia. Soft, nondistended, nontender. + bowel sounds.     Neurologic:  -GCS [id number]  -Alert. Oriented to person, place, and time. Speech fluent. Follows commands.  -Cranial nerves intact  -Motor no focal deficits  -Sensation intact  -Peripheral vision intact, able to differentiate most colors when prompted.     Medications:   Continuous Scheduled  amLODIPine 10 mg QHS   DULoxetine 30 mg Daily   hydrocortisone 10 mg QHS   hydrocortisone 20 mg QAM   lisinopril 20 mg Daily   pantoprazole 40 mg Daily   PRN  hydrALAZINE 20 mg Q6H PRN   labetalol 10 mg Q10 Min PRN   oxyCODONE-acetaminophen 1 tablet Q4H PRN      Today I independently reviewed pertinent medications, lines/drains/airways, imaging, lab results,     Assessment/Plan:     Neuro   IVH (intraventricular hemorrhage)    Present post-operatively   - Repeat serial scans stable         Cardiac/Vascular   Essential hypertension    --160, required multiple PRN IVP antihypertensives overnight  -Continue home Lisinopril 20 mg   -Increase amlodipine 10 mg daily         Endocrine   * Pituitary mass    POD 4 s/p L frontotemporal craniotomy for excision of pituitary tumor  -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

## 2018-06-10 NOTE — ASSESSMENT & PLAN NOTE
--160, required multiple PRN IVP antihypertensives overnight  -Continue home Lisinopril 20 mg   -Increase amlodipine 10 mg daily

## 2018-06-10 NOTE — ASSESSMENT & PLAN NOTE
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

## 2018-06-10 NOTE — PROGRESS NOTES
SAMMIE Lamb notified that pt vomitted entire lunch, pt [id number] 3  Brisk denies nausea now states feels better. SAMMIE lamb wants stat CT Scan of head

## 2018-06-11 PROBLEM — I61.5 IVH (INTRAVENTRICULAR HEMORRHAGE): Status: ACTIVE | Noted: 2018-06-11

## 2018-06-11 LAB
ANION GAP SERPL CALC-SCNC: 12 MMOL/L
BASOPHILS # BLD AUTO: 0.03 K/UL
BASOPHILS NFR BLD: 0.3 %
BUN SERPL-MCNC: 10 MG/DL
CALCIUM SERPL-MCNC: 8.9 MG/DL
CHLORIDE SERPL-SCNC: 100 MMOL/L
CO2 SERPL-SCNC: 25 MMOL/L
CREAT SERPL-MCNC: 0.6 MG/DL
DIFFERENTIAL METHOD: ABNORMAL
EOSINOPHIL # BLD AUTO: 0 K/UL
EOSINOPHIL NFR BLD: 0.1 %
ERYTHROCYTE [DISTWIDTH] IN BLOOD BY AUTOMATED COUNT: 14.3 %
EST. GFR  (AFRICAN AMERICAN): >60 ML/MIN/1.73 M^2
EST. GFR  (NON AFRICAN AMERICAN): >60 ML/MIN/1.73 M^2
GLUCOSE SERPL-MCNC: 121 MG/DL
HCT VFR BLD AUTO: 34.2 %
HGB BLD-MCNC: 11 G/DL
IMM GRANULOCYTES # BLD AUTO: 0.17 K/UL
IMM GRANULOCYTES NFR BLD AUTO: 1.6 %
LYMPHOCYTES # BLD AUTO: 1.5 K/UL
LYMPHOCYTES NFR BLD: 14.4 %
MAGNESIUM SERPL-MCNC: 2.3 MG/DL
MCH RBC QN AUTO: 30 PG
MCHC RBC AUTO-ENTMCNC: 32.2 G/DL
MCV RBC AUTO: 93 FL
MONOCYTES # BLD AUTO: 1.4 K/UL
MONOCYTES NFR BLD: 12.9 %
NEUTROPHILS # BLD AUTO: 7.5 K/UL
NEUTROPHILS NFR BLD: 70.7 %
NRBC BLD-RTO: 0 /100 WBC
PLATELET # BLD AUTO: 194 K/UL
PMV BLD AUTO: 10.5 FL
POTASSIUM SERPL-SCNC: 3.4 MMOL/L
RBC # BLD AUTO: 3.67 M/UL
SODIUM SERPL-SCNC: 137 MMOL/L
WBC # BLD AUTO: 10.65 K/UL

## 2018-06-11 PROCEDURE — 27000221 HC OXYGEN, UP TO 24 HOURS

## 2018-06-11 PROCEDURE — 20000000 HC ICU ROOM

## 2018-06-11 PROCEDURE — 83735 ASSAY OF MAGNESIUM: CPT

## 2018-06-11 PROCEDURE — 25000003 PHARM REV CODE 250: Performed by: NEUROLOGICAL SURGERY

## 2018-06-11 PROCEDURE — 80048 BASIC METABOLIC PNL TOTAL CA: CPT

## 2018-06-11 PROCEDURE — 25000003 PHARM REV CODE 250: Performed by: PHYSICIAN ASSISTANT

## 2018-06-11 PROCEDURE — 63600175 PHARM REV CODE 636 W HCPCS: Performed by: STUDENT IN AN ORGANIZED HEALTH CARE EDUCATION/TRAINING PROGRAM

## 2018-06-11 PROCEDURE — 85025 COMPLETE CBC W/AUTO DIFF WBC: CPT

## 2018-06-11 PROCEDURE — 25000003 PHARM REV CODE 250: Performed by: STUDENT IN AN ORGANIZED HEALTH CARE EDUCATION/TRAINING PROGRAM

## 2018-06-11 RX ORDER — BACITRACIN 500 [USP'U]/G
OINTMENT TOPICAL 2 TIMES DAILY
Status: DISCONTINUED | OUTPATIENT
Start: 2018-06-11 | End: 2018-06-15 | Stop reason: HOSPADM

## 2018-06-11 RX ADMIN — DULOXETINE 30 MG: 30 CAPSULE, DELAYED RELEASE ORAL at 08:06

## 2018-06-11 RX ADMIN — BACITRACIN: 500 OINTMENT TOPICAL at 05:06

## 2018-06-11 RX ADMIN — PANTOPRAZOLE SODIUM 40 MG: 40 TABLET, DELAYED RELEASE ORAL at 08:06

## 2018-06-11 RX ADMIN — LISINOPRIL 20 MG: 20 TABLET ORAL at 08:06

## 2018-06-11 RX ADMIN — HYDROCORTISONE 20 MG: 10 TABLET ORAL at 06:06

## 2018-06-11 RX ADMIN — HYDRALAZINE HYDROCHLORIDE 20 MG: 20 INJECTION INTRAMUSCULAR; INTRAVENOUS at 10:06

## 2018-06-11 RX ADMIN — AMLODIPINE BESYLATE 10 MG: 10 TABLET ORAL at 09:06

## 2018-06-11 RX ADMIN — HYDROCORTISONE 10 MG: 10 TABLET ORAL at 09:06

## 2018-06-11 NOTE — SUBJECTIVE & OBJECTIVE
"Interval History: no AE. AFVSS. Improving mental stauts    Medications:  Continuous Infusions:    Scheduled Meds:   amLODIPine  10 mg Oral QHS    DULoxetine  30 mg Oral Daily    hydrocortisone  10 mg Oral QHS    hydrocortisone  20 mg Oral QAM    lisinopril  20 mg Oral Daily    pantoprazole  40 mg Oral Daily     PRN Meds:hydrALAZINE, labetalol, oxyCODONE-acetaminophen     Review of Systems    Objective:     Weight: 71.8 kg (158 lb 4.6 oz)  Body mass index is 27.17 kg/m².  Vital Signs (Most Recent):  Temp: 98.3 °F (36.8 °C) (06/10/18 2300)  Pulse: 96 (06/11/18 0000)  Resp: 20 (06/11/18 0000)  BP: (!) 147/72 (06/11/18 0000)  SpO2: 96 % (06/11/18 0000) Vital Signs (24h Range):  Temp:  [98.1 °F (36.7 °C)-98.4 °F (36.9 °C)] 98.3 °F (36.8 °C)  Pulse:  [71-96] 96  Resp:  [17-20] 20  SpO2:  [96 %-100 %] 96 %  BP: (127-174)/(62-83) 147/72               Closed/Suction Drain 06/06/18 1333 Right Other (Comment) Accordion 10 Fr. (Active)   Site Description Unable to view 6/7/2018  3:05 PM   Dressing Type Gauze 6/7/2018  3:05 PM   Dressing Status Clean;Dry;Intact 6/7/2018  3:05 PM   Drainage Serosanguineous 6/7/2018  3:05 PM   Status Other (Comment) 6/7/2018  3:05 PM   Output (mL) 70 mL 6/7/2018  5:00 AM            Urethral Catheter 06/06/18 0835 Non-latex 16 Fr. (Active)   Site Assessment Clean;Intact 6/7/2018  3:05 PM   Collection Container Urimeter 6/7/2018  3:05 PM   Securement Method secured to top of thigh w/ adhesive device 6/7/2018  3:05 PM   Catheter Care Performed yes 6/7/2018  7:04 AM   Reason for Continuing Urinary Catheterization Post operative 6/7/2018  3:05 PM   CAUTI Prevention Bundle StatLock in place w 1" slack;Intact seal between catheter & drainage tubing;Drainage bag off the floor;Green sheeting clip in use;No dependent loops or kinks;Drainage bag not overfilled (<2/3 full);Drainage bag below bladder 6/7/2018  7:04 AM   Output (mL) 30 mL 6/7/2018  2:04 PM       Physical Exam:    Neurological:   AAOx3, " NAD  Speech fluent repeats well. Answers questions appropriately  PERRL  Light perception present R eye. Patient correctly identifies examiners hair color. States she can see my face       Awake, alert, oriented x2.   PERRL, some color vision  FC x4.  FS TM EOMI.    Significant Labs:    Recent Labs  Lab 06/09/18  0338 06/10/18  0214   * 113*    142   K 3.3* 3.4*    106   CO2 27 26   BUN 8 7*   CREATININE 0.7 0.7   CALCIUM 8.1* 8.4*   MG 2.0 2.0       Recent Labs  Lab 06/09/18  0338 06/10/18  0214 06/10/18  0245   WBC 11.28 10.92 11.02   HGB 10.0* SEE COMMENT 11.0*   HCT 31.2* SEE COMMENT 33.8*    SEE COMMENT 189     No results for input(s): LABPT, INR, APTT in the last 48 hours.  Microbiology Results (last 7 days)     ** No results found for the last 168 hours. **        All pertinent labs from the last 24 hours have been reviewed.    Significant Diagnostics:  I have reviewed all pertinent imaging results/findings within the past 24 hours.

## 2018-06-11 NOTE — PROGRESS NOTES
[facility]  Neurosurgery  Progress Note    Subjective:     History of Present Illness: Florence Everett is a 83 y.o. female w/ a significant PMHx of HTN, GERD, and pituitary tumor found after patient suffered from a fall and underwent a CT scan. MRI was then done which showed large pituitary tumor w/ extension into suprasellar region. On exam there is left optic pallor. She shows bitemporal hemianopsia and vision is decreased to barely finger counting in the left eye.     MRI of the brain was done at [facility] in [place] on 04/05/18. There is a large lobulated pituitary tumor, which extends on to the planum sphenoidale and up to the third ventricle. The right optic nerve is displaced laterally. The left optic nerve is not well seen. The tumor was measured as greater than 3 cm.     Patient now presents for surgery. No new complaints.    Post-Op Info:  Procedure(s) (LRB):  CRANIOTOMY, USING FRAMELESS STEREOTAXY (STEALTH) (Left)   5 Days Post-Op     Interval History: NAEON.      Medications:  Continuous Infusions:    Scheduled Meds:   amLODIPine  10 mg Oral QHS    bacitracin   Topical (Top) BID    DULoxetine  30 mg Oral Daily    hydrocortisone  10 mg Oral QHS    hydrocortisone  20 mg Oral QAM    lisinopril  20 mg Oral Daily    pantoprazole  40 mg Oral Daily     PRN Meds:hydrALAZINE, labetalol, oxyCODONE-acetaminophen     Review of Systems    Objective:     Weight: 71.8 kg (158 lb 4.6 oz)  Body mass index is 27.17 kg/m².  Vital Signs (Most Recent):  Temp: 98.3 °F (36.8 °C) (06/11/18 1505)  Pulse: 76 (06/11/18 1615)  Resp: 18 (06/11/18 1615)  BP: (!) 161/84 (06/11/18 1615)  SpO2: 99 % (06/11/18 1615) Vital Signs (24h Range):  Temp:  [98.1 °F (36.7 °C)-98.8 °F (37.1 °C)] 98.3 °F (36.8 °C)  Pulse:  [70-96] 76  Resp:  [17-20] 18  SpO2:  [96 %-99 %] 99 %  BP: (128-168)/() 161/84       Date 06/11/18 0700 - 06/12/18 0659   Shift 8920-0843 4732-8291 9666-5490 24 Hour Total  "  I  N  T  A  K  E   P.O. 200   200    Shift Total  (mL/kg) 200  (2.8)   200  (2.8)   O  U  T  P  U  T   Urine  (mL/kg/hr) 400  (0.7)   400    Shift Total  (mL/kg) 400  (5.6)   400  (5.6)   Weight (kg) 71.8 71.8 71.8 71.8            Closed/Suction Drain 06/06/18 1333 Right Other (Comment) Accordion 10 Fr. (Active)   Site Description Unable to view 6/7/2018  3:05 PM   Dressing Type Gauze 6/7/2018  3:05 PM   Dressing Status Clean;Dry;Intact 6/7/2018  3:05 PM   Drainage Serosanguineous 6/7/2018  3:05 PM   Status Other (Comment) 6/7/2018  3:05 PM   Output (mL) 70 mL 6/7/2018  5:00 AM            Urethral Catheter 06/06/18 0835 Non-latex 16 Fr. (Active)   Site Assessment Clean;Intact 6/7/2018  3:05 PM   Collection Container Urimeter 6/7/2018  3:05 PM   Securement Method secured to top of thigh w/ adhesive device 6/7/2018  3:05 PM   Catheter Care Performed yes 6/7/2018  7:04 AM   Reason for Continuing Urinary Catheterization Post operative 6/7/2018  3:05 PM   CAUTI Prevention Bundle StatLock in place w 1" slack;Intact seal between catheter & drainage tubing;Drainage bag off the floor;Green sheeting clip in use;No dependent loops or kinks;Drainage bag not overfilled (<2/3 full);Drainage bag below bladder 6/7/2018  7:04 AM   Output (mL) 30 mL 6/7/2018  2:04 PM       Physical Exam:    Neurological:   AAOx3, NAD  PERRL  5/5 throughout  Counting fingers with both eyes. Color vision present.         Significant Labs:    Recent Labs  Lab 06/10/18  0214 06/11/18  0315   * 121*    137   K 3.4* 3.4*    100   CO2 26 25   BUN 7* 10   CREATININE 0.7 0.6   CALCIUM 8.4* 8.9   MG 2.0 2.3       Recent Labs  Lab 06/10/18  0214 06/10/18  0245 06/11/18  0315   WBC 10.92 11.02 10.65   HGB SEE COMMENT 11.0* 11.0*   HCT SEE COMMENT 33.8* 34.2*   PLT SEE COMMENT 189 194     No results for input(s): LABPT, INR, APTT in the last 48 hours.  Microbiology Results (last 7 days)     ** No results found for the last 168 hours. **    "     All pertinent labs from the last 24 hours have been reviewed.    Significant Diagnostics:  I have reviewed all pertinent imaging results/findings within the past 24 hours.    Assessment/Plan:     * Pituitary mass    83F with likely pituitary adenoma now s/p crani for resection.    -- Neuro stable  -- Hydrocortisone 20am 10pm   -- Pain control.  -- Zofran for nausea prn.  -- Regular diet.  -- Vitals per unit routine  -- PT/OT daily and for recs. Patient OOB >4h/day.  -- PPx: PPI, SQH, IS, SCDs, bowel regimen  -- Dispo: ok to TTF neurosurgery service                Álvaro Crespo MD  Neurosurgery  [facility]

## 2018-06-11 NOTE — SUBJECTIVE & OBJECTIVE
Interval History: NAEON.      Medications:  Continuous Infusions:    Scheduled Meds:   amLODIPine  10 mg Oral QHS    bacitracin   Topical (Top) BID    DULoxetine  30 mg Oral Daily    hydrocortisone  10 mg Oral QHS    hydrocortisone  20 mg Oral QAM    lisinopril  20 mg Oral Daily    pantoprazole  40 mg Oral Daily     PRN Meds:hydrALAZINE, labetalol, oxyCODONE-acetaminophen     Review of Systems    Objective:     Weight: 71.8 kg (158 lb 4.6 oz)  Body mass index is 27.17 kg/m².  Vital Signs (Most Recent):  Temp: 98.3 °F (36.8 °C) (06/11/18 1505)  Pulse: 76 (06/11/18 1615)  Resp: 18 (06/11/18 1615)  BP: (!) 161/84 (06/11/18 1615)  SpO2: 99 % (06/11/18 1615) Vital Signs (24h Range):  Temp:  [98.1 °F (36.7 °C)-98.8 °F (37.1 °C)] 98.3 °F (36.8 °C)  Pulse:  [70-96] 76  Resp:  [17-20] 18  SpO2:  [96 %-99 %] 99 %  BP: (128-168)/() 161/84       Date 06/11/18 0700 - 06/12/18 0659   Shift 6811-8908 8285-0860 3754-6800 24 Hour Total   I  N  T  A  K  E   P.O. 200   200    Shift Total  (mL/kg) 200  (2.8)   200  (2.8)   O  U  T  P  U  T   Urine  (mL/kg/hr) 400  (0.7)   400    Shift Total  (mL/kg) 400  (5.6)   400  (5.6)   Weight (kg) 71.8 71.8 71.8 71.8            Closed/Suction Drain 06/06/18 1333 Right Other (Comment) Accordion 10 Fr. (Active)   Site Description Unable to view 6/7/2018  3:05 PM   Dressing Type Gauze 6/7/2018  3:05 PM   Dressing Status Clean;Dry;Intact 6/7/2018  3:05 PM   Drainage Serosanguineous 6/7/2018  3:05 PM   Status Other (Comment) 6/7/2018  3:05 PM   Output (mL) 70 mL 6/7/2018  5:00 AM            Urethral Catheter 06/06/18 0835 Non-latex 16 Fr. (Active)   Site Assessment Clean;Intact 6/7/2018  3:05 PM   Collection Container Urimeter 6/7/2018  3:05 PM   Securement Method secured to top of thigh w/ adhesive device 6/7/2018  3:05 PM   Catheter Care Performed yes 6/7/2018  7:04 AM   Reason for Continuing Urinary Catheterization Post operative 6/7/2018  3:05 PM   CAUTI Prevention Bundle StatLock in  "place w 1" slack;Intact seal between catheter & drainage tubing;Drainage bag off the floor;Green sheeting clip in use;No dependent loops or kinks;Drainage bag not overfilled (<2/3 full);Drainage bag below bladder 6/7/2018  7:04 AM   Output (mL) 30 mL 6/7/2018  2:04 PM       Physical Exam:    Neurological:   AAOx3, NAD  PERRL  5/5 throughout  Counting fingers with both eyes. Color vision present.         Significant Labs:    Recent Labs  Lab 06/10/18  0214 06/11/18  0315   * 121*    137   K 3.4* 3.4*    100   CO2 26 25   BUN 7* 10   CREATININE 0.7 0.6   CALCIUM 8.4* 8.9   MG 2.0 2.3       Recent Labs  Lab 06/10/18  0214 06/10/18  0245 06/11/18 0315   WBC 10.92 11.02 10.65   HGB SEE COMMENT 11.0* 11.0*   HCT SEE COMMENT 33.8* 34.2*   PLT SEE COMMENT 189 194     No results for input(s): LABPT, INR, APTT in the last 48 hours.  Microbiology Results (last 7 days)     ** No results found for the last 168 hours. **        All pertinent labs from the last 24 hours have been reviewed.    Significant Diagnostics:  I have reviewed all pertinent imaging results/findings within the past 24 hours.  "

## 2018-06-11 NOTE — PROGRESS NOTES
[facility]  Neurosurgery  Progress Note    Subjective:     History of Present Illness: Florence Everett is a 83 y.o. female w/ a significant PMHx of HTN, GERD, and pituitary tumor found after patient suffered from a fall and underwent a CT scan. MRI was then done which showed large pituitary tumor w/ extension into suprasellar region. On exam there is left optic pallor. She shows bitemporal hemianopsia and vision is decreased to barely finger counting in the left eye.     MRI of the brain was done at [facility] in [place] on 04/05/18. There is a large lobulated pituitary tumor, which extends on to the planum sphenoidale and up to the third ventricle. The right optic nerve is displaced laterally. The left optic nerve is not well seen. The tumor was measured as greater than 3 cm.     Patient now presents for surgery. No new complaints.    Post-Op Info:  Procedure(s) (LRB):  CRANIOTOMY, USING FRAMELESS STEREOTAXY (STEALTH) (Left)   5 Days Post-Op     Interval History: nauseated this morning      Medications:  Continuous Infusions:    Scheduled Meds:   amLODIPine  10 mg Oral QHS    DULoxetine  30 mg Oral Daily    hydrocortisone  10 mg Oral QHS    hydrocortisone  20 mg Oral QAM    lisinopril  20 mg Oral Daily    pantoprazole  40 mg Oral Daily     PRN Meds:hydrALAZINE, labetalol, oxyCODONE-acetaminophen     Review of Systems    Objective:     Weight: 71.8 kg (158 lb 4.6 oz)  Body mass index is 27.17 kg/m².  Vital Signs (Most Recent):  Temp: 98.8 °F (37.1 °C) (06/11/18 1105)  Pulse: 73 (06/11/18 1300)  Resp: 17 (06/11/18 1300)  BP: (!) 152/74 (06/11/18 1300)  SpO2: 99 % (06/11/18 1300) Vital Signs (24h Range):  Temp:  [98.1 °F (36.7 °C)-98.8 °F (37.1 °C)] 98.8 °F (37.1 °C)  Pulse:  [70-96] 73  Resp:  [17-20] 17  SpO2:  [96 %-100 %] 99 %  BP: (128-173)/() 152/74       Date 06/11/18 0700 - 06/12/18 0659   Shift 5677-1920 4359-7123 2958-0611 24 Hour Total   I  N  T  A  K  E    "P.O. 200   200    Shift Total  (mL/kg) 200  (2.8)   200  (2.8)   O  U  T  P  U  T   Urine  (mL/kg/hr) 400   400    Shift Total  (mL/kg) 400  (5.6)   400  (5.6)   Weight (kg) 71.8 71.8 71.8 71.8            Closed/Suction Drain 06/06/18 1333 Right Other (Comment) Accordion 10 Fr. (Active)   Site Description Unable to view 6/7/2018  3:05 PM   Dressing Type Gauze 6/7/2018  3:05 PM   Dressing Status Clean;Dry;Intact 6/7/2018  3:05 PM   Drainage Serosanguineous 6/7/2018  3:05 PM   Status Other (Comment) 6/7/2018  3:05 PM   Output (mL) 70 mL 6/7/2018  5:00 AM            Urethral Catheter 06/06/18 0835 Non-latex 16 Fr. (Active)   Site Assessment Clean;Intact 6/7/2018  3:05 PM   Collection Container Urimeter 6/7/2018  3:05 PM   Securement Method secured to top of thigh w/ adhesive device 6/7/2018  3:05 PM   Catheter Care Performed yes 6/7/2018  7:04 AM   Reason for Continuing Urinary Catheterization Post operative 6/7/2018  3:05 PM   CAUTI Prevention Bundle StatLock in place w 1" slack;Intact seal between catheter & drainage tubing;Drainage bag off the floor;Green sheeting clip in use;No dependent loops or kinks;Drainage bag not overfilled (<2/3 full);Drainage bag below bladder 6/7/2018  7:04 AM   Output (mL) 30 mL 6/7/2018  2:04 PM       Physical Exam:    Neurological:   AAOx3, NAD  Speech fluent repeats well. Answers questions appropriately  PERRL  Light perception present R eye. Patient correctly identifies examiners hair color. States she can see my face         Significant Labs:    Recent Labs  Lab 06/10/18  0214 06/11/18  0315   * 121*    137   K 3.4* 3.4*    100   CO2 26 25   BUN 7* 10   CREATININE 0.7 0.6   CALCIUM 8.4* 8.9   MG 2.0 2.3       Recent Labs  Lab 06/10/18  0214 06/10/18  0245 06/11/18  0315   WBC 10.92 11.02 10.65   HGB SEE COMMENT 11.0* 11.0*   HCT SEE COMMENT 33.8* 34.2*   PLT SEE COMMENT 189 194     No results for input(s): LABPT, INR, APTT in the last 48 hours.  Microbiology " Results (last 7 days)     ** No results found for the last 168 hours. **        All pertinent labs from the last 24 hours have been reviewed.    Significant Diagnostics:  I have reviewed all pertinent imaging results/findings within the past 24 hours.    Assessment/Plan:     * Pituitary mass    83F with likely pituitary adenoma now s/p crani for resection.    Neuro stable  -- HV out  -- Ancef while drain in place.    20am 10pm    Monitor for DI, strict IOs  -- Pain control.  -- Zofran for nausea prn.  -- Regular diet.  -- Vitals per unit routine  -- PT/OT daily and for recs. Patient OOB >4h/day.  -- PPx: PPI, SQH, IS, SCDs, bowel regimen  -- Dispo: ok to TTF                Manjit Kan MD  Neurosurgery  [facility]

## 2018-06-11 NOTE — PLAN OF CARE
SW met with Pt and family at bedside. Discussed therapy recs for rehab with possible progression to HH and provided list for both HH and rehab. Family reported Pt  has HH already and they would prefer a home DC with that company ([facility]). They reported they have family that will be staying with the couples for 24 hour care until they no longer need it. If they see she does still need rehab closer to dc, they will consider it.    Araseli Garland, COLBY  Neurocritical Care   [facility]  [id number]

## 2018-06-11 NOTE — PROGRESS NOTES
[facility]  Neurosurgery  Progress Note    Subjective:     History of Present Illness: Florence Everett is a 83 y.o. female w/ a significant PMHx of HTN, GERD, and pituitary tumor found after patient suffered from a fall and underwent a CT scan. MRI was then done which showed large pituitary tumor w/ extension into suprasellar region. On exam there is left optic pallor. She shows bitemporal hemianopsia and vision is decreased to barely finger counting in the left eye.     MRI of the brain was done at [facility] in [place] on 04/05/18. There is a large lobulated pituitary tumor, which extends on to the planum sphenoidale and up to the third ventricle. The right optic nerve is displaced laterally. The left optic nerve is not well seen. The tumor was measured as greater than 3 cm.     Patient now presents for surgery. No new complaints.    Post-Op Info:  Procedure(s) (LRB):  CRANIOTOMY, USING FRAMELESS STEREOTAXY (STEALTH) (Left)   5 Days Post-Op     Interval History: no AE. AFVSS. Improving mental stauts    Medications:  Continuous Infusions:    Scheduled Meds:   amLODIPine  10 mg Oral QHS    DULoxetine  30 mg Oral Daily    hydrocortisone  10 mg Oral QHS    hydrocortisone  20 mg Oral QAM    lisinopril  20 mg Oral Daily    pantoprazole  40 mg Oral Daily     PRN Meds:hydrALAZINE, labetalol, oxyCODONE-acetaminophen     Review of Systems    Objective:     Weight: 71.8 kg (158 lb 4.6 oz)  Body mass index is 27.17 kg/m².  Vital Signs (Most Recent):  Temp: 98.3 °F (36.8 °C) (06/10/18 2300)  Pulse: 96 (06/11/18 0000)  Resp: 20 (06/11/18 0000)  BP: (!) 147/72 (06/11/18 0000)  SpO2: 96 % (06/11/18 0000) Vital Signs (24h Range):  Temp:  [98.1 °F (36.7 °C)-98.4 °F (36.9 °C)] 98.3 °F (36.8 °C)  Pulse:  [71-96] 96  Resp:  [17-20] 20  SpO2:  [96 %-100 %] 96 %  BP: (127-174)/(62-83) 147/72               Closed/Suction Drain 06/06/18 1333 Right Other (Comment) Accordion 10 Fr. (Active)  "  Site Description Unable to view 6/7/2018  3:05 PM   Dressing Type Gauze 6/7/2018  3:05 PM   Dressing Status Clean;Dry;Intact 6/7/2018  3:05 PM   Drainage Serosanguineous 6/7/2018  3:05 PM   Status Other (Comment) 6/7/2018  3:05 PM   Output (mL) 70 mL 6/7/2018  5:00 AM            Urethral Catheter 06/06/18 0835 Non-latex 16 Fr. (Active)   Site Assessment Clean;Intact 6/7/2018  3:05 PM   Collection Container Urimeter 6/7/2018  3:05 PM   Securement Method secured to top of thigh w/ adhesive device 6/7/2018  3:05 PM   Catheter Care Performed yes 6/7/2018  7:04 AM   Reason for Continuing Urinary Catheterization Post operative 6/7/2018  3:05 PM   CAUTI Prevention Bundle StatLock in place w 1" slack;Intact seal between catheter & drainage tubing;Drainage bag off the floor;Green sheeting clip in use;No dependent loops or kinks;Drainage bag not overfilled (<2/3 full);Drainage bag below bladder 6/7/2018  7:04 AM   Output (mL) 30 mL 6/7/2018  2:04 PM       Physical Exam:    Neurological:   AAOx3, NAD  Speech fluent repeats well. Answers questions appropriately  PERRL  Light perception present R eye. Patient correctly identifies examiners hair color. States she can see my face       Awake, alert, oriented x2.   PERRL, some color vision  FC x4.  FS TM EOMI.    Significant Labs:    Recent Labs  Lab 06/09/18  0338 06/10/18  0214   * 113*    142   K 3.3* 3.4*    106   CO2 27 26   BUN 8 7*   CREATININE 0.7 0.7   CALCIUM 8.1* 8.4*   MG 2.0 2.0       Recent Labs  Lab 06/09/18  0338 06/10/18  0214 06/10/18  0245   WBC 11.28 10.92 11.02   HGB 10.0* SEE COMMENT 11.0*   HCT 31.2* SEE COMMENT 33.8*    SEE COMMENT 189     No results for input(s): LABPT, INR, APTT in the last 48 hours.  Microbiology Results (last 7 days)     ** No results found for the last 168 hours. **        All pertinent labs from the last 24 hours have been reviewed.    Significant Diagnostics:  I have reviewed all pertinent imaging " results/findings within the past 24 hours.    Assessment/Plan:     * Pituitary mass    83F with likely pituitary adenoma now s/p crani for resection.    Neuro stable  -- Remove HV  -- Ancef while drain in place.    20am 10pm    Monitor for DI, strict IOs  -- Pain control.  -- Zofran for nausea prn.  -- Regular diet.  -- Vitals per unit routine  -- PT/OT daily and for recs. Patient OOB >4h/day.  -- PPx: PPI, SQH, IS, SCDs, bowel regimen  -- Dispo: cont ICU care                Manjit Kan MD  Neurosurgery  [facility]

## 2018-06-11 NOTE — ASSESSMENT & PLAN NOTE
83F with likely pituitary adenoma now s/p crani for resection.    Neuro stable  -- HV out  -- Ancef while drain in place.    20am 10pm    Monitor for DI, strict IOs  -- Pain control.  -- Zofran for nausea prn.  -- Regular diet.  -- Vitals per unit routine  -- PT/OT daily and for recs. Patient OOB >4h/day.  -- PPx: PPI, SQH, IS, SCDs, bowel regimen  -- Dispo: ok to TTF

## 2018-06-11 NOTE — PLAN OF CARE
06/11/18 1235   Discharge Reassessment   Assessment Type Discharge Planning Reassessment   Provided patient/caregiver education on the expected discharge date and the discharge plan Yes   Do you have any problems affording any of your prescribed medications? No   Discharge Plan A Home with family;Home Health   Discharge Plan B Rehab   Patient choice form signed by patient/caregiver N/A   Can the patient answer the patient profile reliably? Yes, cognitively intact   Describe the patient's ability to walk at the present time. Walks with the help of equipment   How often would a person be available to care for the patient? Whenever needed   Number of comorbid conditions (as recorded on the chart) Two   During the past month, has the patient often been bothered by feeling down, depressed or hopeless? No   During the past month, has the patient often been bothered by little interest or pleasure in doing things? No       Patient with transfer to floor orders  Therapy recommending Rehab vs HH pending progress  Diet Adult Regular       Sivan Bill RN, CCRN-K, [facility]  Neuro-Critical Care   [phone]

## 2018-06-11 NOTE — ASSESSMENT & PLAN NOTE
83F with likely pituitary adenoma now s/p crani for resection.    Neuro stable  -- Remove HV  -- Ancef while drain in place.    20am 10pm    Monitor for DI, strict IOs  -- Pain control.  -- Zofran for nausea prn.  -- Regular diet.  -- Vitals per unit routine  -- PT/OT daily and for recs. Patient OOB >4h/day.  -- PPx: PPI, SQH, IS, SCDs, bowel regimen  -- Dispo: cont ICU care

## 2018-06-11 NOTE — ASSESSMENT & PLAN NOTE
83F with likely pituitary adenoma now s/p crani for resection.    -- Neuro stable  -- Hydrocortisone 20am 10pm   -- Pain control.  -- Zofran for nausea prn.  -- Regular diet.  -- Vitals per unit routine  -- PT/OT daily and for recs. Patient OOB >4h/day.  -- PPx: PPI, SQH, IS, SCDs, bowel regimen  -- Dispo: ok to TTF neurosurgery service

## 2018-06-11 NOTE — HPI
Florence Everett is a 83 y.o. female w/ a significant PMHx of HTN, GERD, and pituitary tumor found after patient suffered from a fall and underwent a CT scan. MRI was then done which showed large pituitary tumor w/ extension into suprasellar region. On exam there is left optic pallor. She shows bitemporal hemianopsia and vision is decreased to barely finger counting in the left eye.     MRI of the brain was done at [facility] in [place] on 04/05/18. There is a large lobulated pituitary tumor, which extends on to the planum sphenoidale and up to the third ventricle. The right optic nerve is displaced laterally. The left optic nerve is not well seen. The tumor was measured as greater than 3 cm.     Patient now presents for surgery. No new complaints.

## 2018-06-11 NOTE — PLAN OF CARE
Problem: Patient Care Overview  Goal: Plan of Care Review  POC reviewed with pt and family at 1000. Pt and family verbalized understanding. Questions and concerns addressed.  Pt progressing toward goals. Will be transferred to 7th floor. Will continue to monitor. See flowsheets for full assessment and VS info.

## 2018-06-11 NOTE — SUBJECTIVE & OBJECTIVE
"Interval History: nauseated this morning      Medications:  Continuous Infusions:    Scheduled Meds:   amLODIPine  10 mg Oral QHS    DULoxetine  30 mg Oral Daily    hydrocortisone  10 mg Oral QHS    hydrocortisone  20 mg Oral QAM    lisinopril  20 mg Oral Daily    pantoprazole  40 mg Oral Daily     PRN Meds:hydrALAZINE, labetalol, oxyCODONE-acetaminophen     Review of Systems    Objective:     Weight: 71.8 kg (158 lb 4.6 oz)  Body mass index is 27.17 kg/m².  Vital Signs (Most Recent):  Temp: 98.8 °F (37.1 °C) (06/11/18 1105)  Pulse: 73 (06/11/18 1300)  Resp: 17 (06/11/18 1300)  BP: (!) 152/74 (06/11/18 1300)  SpO2: 99 % (06/11/18 1300) Vital Signs (24h Range):  Temp:  [98.1 °F (36.7 °C)-98.8 °F (37.1 °C)] 98.8 °F (37.1 °C)  Pulse:  [70-96] 73  Resp:  [17-20] 17  SpO2:  [96 %-100 %] 99 %  BP: (128-173)/() 152/74       Date 06/11/18 0700 - 06/12/18 0659   Shift 4712-6255 3841-2123 9448-1126 24 Hour Total   I  N  T  A  K  E   P.O. 200   200    Shift Total  (mL/kg) 200  (2.8)   200  (2.8)   O  U  T  P  U  T   Urine  (mL/kg/hr) 400   400    Shift Total  (mL/kg) 400  (5.6)   400  (5.6)   Weight (kg) 71.8 71.8 71.8 71.8            Closed/Suction Drain 06/06/18 1333 Right Other (Comment) Accordion 10 Fr. (Active)   Site Description Unable to view 6/7/2018  3:05 PM   Dressing Type Gauze 6/7/2018  3:05 PM   Dressing Status Clean;Dry;Intact 6/7/2018  3:05 PM   Drainage Serosanguineous 6/7/2018  3:05 PM   Status Other (Comment) 6/7/2018  3:05 PM   Output (mL) 70 mL 6/7/2018  5:00 AM            Urethral Catheter 06/06/18 0835 Non-latex 16 Fr. (Active)   Site Assessment Clean;Intact 6/7/2018  3:05 PM   Collection Container Urimeter 6/7/2018  3:05 PM   Securement Method secured to top of thigh w/ adhesive device 6/7/2018  3:05 PM   Catheter Care Performed yes 6/7/2018  7:04 AM   Reason for Continuing Urinary Catheterization Post operative 6/7/2018  3:05 PM   CAUTI Prevention Bundle StatLock in place w 1" slack;Intact " seal between catheter & drainage tubing;Drainage bag off the floor;Green sheeting clip in use;No dependent loops or kinks;Drainage bag not overfilled (<2/3 full);Drainage bag below bladder 6/7/2018  7:04 AM   Output (mL) 30 mL 6/7/2018  2:04 PM       Physical Exam:    Neurological:   AAOx3, NAD  Speech fluent repeats well. Answers questions appropriately  PERRL  Light perception present R eye. Patient correctly identifies examiners hair color. States she can see my face         Significant Labs:    Recent Labs  Lab 06/10/18  0214 06/11/18  0315   * 121*    137   K 3.4* 3.4*    100   CO2 26 25   BUN 7* 10   CREATININE 0.7 0.6   CALCIUM 8.4* 8.9   MG 2.0 2.3       Recent Labs  Lab 06/10/18  0214 06/10/18  0245 06/11/18  0315   WBC 10.92 11.02 10.65   HGB SEE COMMENT 11.0* 11.0*   HCT SEE COMMENT 33.8* 34.2*   PLT SEE COMMENT 189 194     No results for input(s): LABPT, INR, APTT in the last 48 hours.  Microbiology Results (last 7 days)     ** No results found for the last 168 hours. **        All pertinent labs from the last 24 hours have been reviewed.    Significant Diagnostics:  I have reviewed all pertinent imaging results/findings within the past 24 hours.

## 2018-06-12 PROBLEM — G93.40 ENCEPHALOPATHY ACUTE: Status: ACTIVE | Noted: 2018-06-12

## 2018-06-12 PROBLEM — E87.1 HYPONATREMIA: Status: ACTIVE | Noted: 2018-06-12

## 2018-06-12 LAB
ANION GAP SERPL CALC-SCNC: 10 MMOL/L
BASOPHILS # BLD AUTO: 0.03 K/UL
BASOPHILS NFR BLD: 0.3 %
BUN SERPL-MCNC: 10 MG/DL
CALCIUM SERPL-MCNC: 8.5 MG/DL
CHLORIDE SERPL-SCNC: 98 MMOL/L
CO2 SERPL-SCNC: 27 MMOL/L
CREAT SERPL-MCNC: 0.6 MG/DL
CREAT UR-MCNC: 28 MG/DL
DIFFERENTIAL METHOD: ABNORMAL
EOSINOPHIL # BLD AUTO: 0 K/UL
EOSINOPHIL NFR BLD: 0.3 %
ERYTHROCYTE [DISTWIDTH] IN BLOOD BY AUTOMATED COUNT: 13.9 %
EST. GFR  (AFRICAN AMERICAN): >60 ML/MIN/1.73 M^2
EST. GFR  (NON AFRICAN AMERICAN): >60 ML/MIN/1.73 M^2
GLUCOSE SERPL-MCNC: 116 MG/DL
HCT VFR BLD AUTO: 35.1 %
HGB BLD-MCNC: 11.8 G/DL
IMM GRANULOCYTES # BLD AUTO: 0.2 K/UL
IMM GRANULOCYTES NFR BLD AUTO: 1.7 %
LYMPHOCYTES # BLD AUTO: 1.6 K/UL
LYMPHOCYTES NFR BLD: 14 %
MAGNESIUM SERPL-MCNC: 2.2 MG/DL
MCH RBC QN AUTO: 30.6 PG
MCHC RBC AUTO-ENTMCNC: 33.6 G/DL
MCV RBC AUTO: 91 FL
MONOCYTES # BLD AUTO: 1.7 K/UL
MONOCYTES NFR BLD: 14.7 %
NEUTROPHILS # BLD AUTO: 8 K/UL
NEUTROPHILS NFR BLD: 69 %
NRBC BLD-RTO: 0 /100 WBC
OSMOLALITY SERPL: 282 MOSM/KG
OSMOLALITY UR: 277 MOSM/KG
PLATELET # BLD AUTO: 205 K/UL
PMV BLD AUTO: 10.5 FL
POTASSIUM SERPL-SCNC: 3.6 MMOL/L
RBC # BLD AUTO: 3.85 M/UL
SODIUM SERPL-SCNC: 132 MMOL/L
SODIUM SERPL-SCNC: 135 MMOL/L
SODIUM SERPL-SCNC: 135 MMOL/L
SODIUM SERPL-SCNC: 136 MMOL/L
SODIUM UR-SCNC: 61 MMOL/L
WBC # BLD AUTO: 11.59 K/UL

## 2018-06-12 PROCEDURE — 20000000 HC ICU ROOM

## 2018-06-12 PROCEDURE — 80048 BASIC METABOLIC PNL TOTAL CA: CPT

## 2018-06-12 PROCEDURE — 83935 ASSAY OF URINE OSMOLALITY: CPT

## 2018-06-12 PROCEDURE — 84295 ASSAY OF SERUM SODIUM: CPT | Mod: 91

## 2018-06-12 PROCEDURE — 95951 HC EEG MONITORING/VIDEO RECORD: CPT

## 2018-06-12 PROCEDURE — 83930 ASSAY OF BLOOD OSMOLALITY: CPT

## 2018-06-12 PROCEDURE — 97110 THERAPEUTIC EXERCISES: CPT

## 2018-06-12 PROCEDURE — 63600175 PHARM REV CODE 636 W HCPCS: Performed by: STUDENT IN AN ORGANIZED HEALTH CARE EDUCATION/TRAINING PROGRAM

## 2018-06-12 PROCEDURE — 25000003 PHARM REV CODE 250: Performed by: PHYSICIAN ASSISTANT

## 2018-06-12 PROCEDURE — 84300 ASSAY OF URINE SODIUM: CPT

## 2018-06-12 PROCEDURE — 25000003 PHARM REV CODE 250: Performed by: STUDENT IN AN ORGANIZED HEALTH CARE EDUCATION/TRAINING PROGRAM

## 2018-06-12 PROCEDURE — 27000221 HC OXYGEN, UP TO 24 HOURS

## 2018-06-12 PROCEDURE — 99291 CRITICAL CARE FIRST HOUR: CPT | Mod: ,,, | Performed by: PSYCHIATRY & NEUROLOGY

## 2018-06-12 PROCEDURE — 95951 PR EEG MONITORING/VIDEORECORD: CPT | Mod: 26,,, | Performed by: PSYCHIATRY & NEUROLOGY

## 2018-06-12 PROCEDURE — 94761 N-INVAS EAR/PLS OXIMETRY MLT: CPT

## 2018-06-12 PROCEDURE — 82570 ASSAY OF URINE CREATININE: CPT

## 2018-06-12 PROCEDURE — 84295 ASSAY OF SERUM SODIUM: CPT

## 2018-06-12 PROCEDURE — 25000003 PHARM REV CODE 250: Performed by: NEUROLOGICAL SURGERY

## 2018-06-12 PROCEDURE — 97116 GAIT TRAINING THERAPY: CPT

## 2018-06-12 PROCEDURE — 85025 COMPLETE CBC W/AUTO DIFF WBC: CPT

## 2018-06-12 PROCEDURE — 83735 ASSAY OF MAGNESIUM: CPT

## 2018-06-12 PROCEDURE — 51702 INSERT TEMP BLADDER CATH: CPT

## 2018-06-12 RX ADMIN — DULOXETINE 30 MG: 30 CAPSULE, DELAYED RELEASE ORAL at 08:06

## 2018-06-12 RX ADMIN — HYDRALAZINE HYDROCHLORIDE 10 MG: 20 INJECTION INTRAMUSCULAR; INTRAVENOUS at 06:06

## 2018-06-12 RX ADMIN — PANTOPRAZOLE SODIUM 40 MG: 40 TABLET, DELAYED RELEASE ORAL at 08:06

## 2018-06-12 RX ADMIN — AMLODIPINE BESYLATE 10 MG: 10 TABLET ORAL at 09:06

## 2018-06-12 RX ADMIN — HYDROCORTISONE 20 MG: 10 TABLET ORAL at 07:06

## 2018-06-12 RX ADMIN — HYDROCORTISONE 10 MG: 10 TABLET ORAL at 09:06

## 2018-06-12 RX ADMIN — BACITRACIN: 500 OINTMENT TOPICAL at 08:06

## 2018-06-12 RX ADMIN — LISINOPRIL 20 MG: 20 TABLET ORAL at 08:06

## 2018-06-12 NOTE — PLAN OF CARE
Problem: Patient Care Overview  Goal: Plan of Care Review  POC reviewed with pt and family at 1000. Pt family verbalized understanding. Questions and concerns addressed. Pt was taken to STAT CT for neuro change. Pt being connected to 24 hr EEG. 1 Liter fluid restriction. Na check q6. Urine panel was collected. Will continue to monitor. See flowsheets for full assessment and VS info.

## 2018-06-12 NOTE — PROGRESS NOTES
Notified SAMMIE Camargo of pt having difficulty with word finding at this time. Pt continuing to repeat herself and fixated on numbers. Pt remains oriented to self and time but disoriented to place. MD Augusto reported to pt bedside to examine pt. PA stated that she would notify neurosurgery of neuro change. Was instructed to obtain stat CT of head and serum Na level at this time. Will continue to monitor pt very closely.

## 2018-06-12 NOTE — PT/OT/SLP PROGRESS
Physical Therapy Treatment    Patient Name:  Florence Everett   MRN:  [id number]    Recommendations:     Discharge Recommendations:  Rehabilitation facility (pending progress)  Discharge Equipment Recommendations: walker, rolling   Barriers to discharge: Decreased caregiver support    Assessment:     Florence Everett is a 83 y.o. female admitted with a medical diagnosis of Pituitary mass.  She presents with the following impairments/functional limitations:  weakness, impaired endurance, impaired functional mobilty, gait instability, impaired balance, impaired self care skills, impaired cardiopulmonary response to activity. Pt's impaired standing balance and visual deficits are impacting her independence with functional ambulation and ADLs. Tolerated trial of increased gait distance this visit with vital signs stable- Pt would benefit from trial of gait with RW use 2/2 instability to reduce fall risk. Pt noted to display increased lethargy this visit- RN aware. Pt would benefit from continued PT intervention to address below listed deficits and maximize return to PLOF.     Rehab Prognosis: good; patient would benefit from acute skilled PT services to address these deficits and reach maximum level of function.      Recent Surgery: Procedure(s) (LRB):  CRANIOTOMY, USING FRAMELESS STEREOTAXY (STEALTH) (Left) 6 Days Post-Op    Plan:     During this hospitalization, patient to be seen 4 x/week to address the above listed problems via gait training, therapeutic activities, therapeutic exercises, neuromuscular re-education  · Plan of Care Expires:  07/09/18   Plan of Care Reviewed with: patient, daughter    Subjective     Communicated with RN prior to session.  Patient found sitting up in chair with daughter present upon PT entry to room. Pt agreeable to treatment, increased lethargy noted throughout session. Pt's daughter reported that she feels patient has been more lethargic over the past couple of days because she has not  "been moving much.       Chief Complaint: fatigue, impaired vision   Patient comments/goals: "I just want to go home"   Pain/Comfort:  · Pain Rating 1: 0/10  · Pain Rating Post-Intervention 1: 0/10    Patients cultural, spiritual, Anabaptism conflicts given the current situation: no conflicts    Objective:     Patient found with: blood pressure cuff, pulse ox (continuous), peripheral IV, telemetry, oxygen     General Precautions: Standard, fall   Orthopedic Precautions:N/A   Braces: N/A     Functional Mobility:       Bed Mobility  N/T      Transfers · Sit <> Stand: SBA from bedside chair x 4 trials with no AD. SBA from toilet x 1 trial with use of grab bar.      Gait  · Distance: 10 ft. + 50 ft.   · Assistance level: no AD, HHA required; min A for gait, balance and safety  · Gait Deviations: multi-directional instability, decreased step length, postural sway, decreased vandana, narrow base of support.               AM-PAC 6 CLICK MOBILITY  Turning over in bed (including adjusting bedclothes, sheets and blankets)?: 3  Sitting down on and standing up from a chair with arms (e.g., wheelchair, bedside commode, etc.): 3  Moving from lying on back to sitting on the side of the bed?: 3  Moving to and from a bed to a chair (including a wheelchair)?: 3  Need to walk in hospital room?: 3  Climbing 3-5 steps with a railing?: 2  Total Score: 17       Therapeutic Activities and Exercises:  Therapist facilitated progression of gait training to improve gait stability, endurance, and independence with functional ambulation. Pt on portable monitor with RN supervision 2/2 NCC setting. Vital signs stable during gait activity. Pt unable to read room numbers during gait trial, reported all colors look "white and blue." Required HHA and CGA, min A overall 2/2 impaired balance. Cueing provided to increase step length and widen base of support. Pt fatigued throughout session, appearing drowsy. RN aware.     Therex for BLE functional " strengthening and endurance:   - seated LAQ x 15 reps  - seated marches x 15 reps  - seated ankle pumps x 15 reps   - 3 consecutive reps of sit<>stand from bedside chair with no AD; performed with SBA     Pt and daughter educated on fall risk, safety with mobility, PT POC, and activity recommendations. Pt would benefit from trial of gait with RW use 2/2 instability- discussed with family and OT.        Patient left up in chair with all lines intact, call button in reach, RN notified and daughter present.    GOALS:    Physical Therapy Goals        Problem: Physical Therapy Goal    Goal Priority Disciplines Outcome Goal Variances Interventions   Physical Therapy Goal     PT/OT, PT Ongoing (interventions implemented as appropriate)     Description:  Goals to be met by: 18    Patient will increase functional independence with mobility by performin. Supine <> sit with [place]  2. Sit to stand transfer with Modified [place]  3. Gait  x 150 feet with Supervision with or without least restrictive AD.   4. Stand for 3 minutes with Supervision while performing dynamic balance activities to reduce fall risk   5. Lower extremity exercise program x20 reps per handout, with supervision                      Time Tracking:     PT Received On: 18  PT Start Time: 741     PT Stop Time: 804  PT Total Time (min): 23 min     Billable Minutes: Gait Training 13 min and Therapeutic Exercise 10 min    Treatment Type: Treatment  PT/PTA: PT     PTA Visit Number: 0     Chiquita Valencia PT, DPT   2018  Pager: [phone]

## 2018-06-12 NOTE — PROGRESS NOTES
[facility]  Neurosurgery  Progress Note    Subjective:     History of Present Illness: Florence Everett is a 83 y.o. female w/ a significant PMHx of HTN, GERD, and pituitary tumor found after patient suffered from a fall and underwent a CT scan. MRI was then done which showed large pituitary tumor w/ extension into suprasellar region. On exam there is left optic pallor. She shows bitemporal hemianopsia and vision is decreased to barely finger counting in the left eye.     MRI of the brain was done at [facility] in [place] on 04/05/18. There is a large lobulated pituitary tumor, which extends on to the planum sphenoidale and up to the third ventricle. The right optic nerve is displaced laterally. The left optic nerve is not well seen. The tumor was measured as greater than 3 cm.     Patient now presents for surgery. No new complaints.    Post-Op Info:  Procedure(s) (LRB):  CRANIOTOMY, USING FRAMELESS STEREOTAXY (STEALTH) (Left)   6 Days Post-Op     Interval History: NAEON.      Medications:  Continuous Infusions:    Scheduled Meds:   amLODIPine  10 mg Oral QHS    bacitracin   Topical (Top) BID    DULoxetine  30 mg Oral Daily    hydrocortisone  10 mg Oral QHS    hydrocortisone  20 mg Oral QAM    lisinopril  20 mg Oral Daily    pantoprazole  40 mg Oral Daily     PRN Meds:hydrALAZINE, labetalol, oxyCODONE-acetaminophen     Review of Systems    Objective:     Weight: 71.8 kg (158 lb 4.6 oz)  Body mass index is 27.17 kg/m².  Vital Signs (Most Recent):  Temp: 98.5 °F (36.9 °C) (06/12/18 1100)  Pulse: 78 (06/12/18 1100)  Resp: 17 (06/12/18 1100)  BP: (!) 161/77 (06/12/18 1100)  SpO2: 98 % (06/12/18 1100) Vital Signs (24h Range):  Temp:  [98.2 °F (36.8 °C)-99.2 °F (37.3 °C)] 98.5 °F (36.9 °C)  Pulse:  [71-98] 78  Resp:  [14-18] 17  SpO2:  [95 %-99 %] 98 %  BP: (117-175)/(58-92) 161/77       Date 06/12/18 0700 - 06/13/18 0659   Shift 7562-8493 3129-7579 2054-0250 24 Hour Total  "  I  N  T  A  K  E   Shift Total  (mL/kg)       O  U  T  P  U  T   Urine  (mL/kg/hr) 150   150    Shift Total  (mL/kg) 150  (2.1)   150  (2.1)   Weight (kg) 71.8 71.8 71.8 71.8            Closed/Suction Drain 06/06/18 1333 Right Other (Comment) Accordion 10 Fr. (Active)   Site Description Unable to view 6/7/2018  3:05 PM   Dressing Type Gauze 6/7/2018  3:05 PM   Dressing Status Clean;Dry;Intact 6/7/2018  3:05 PM   Drainage Serosanguineous 6/7/2018  3:05 PM   Status Other (Comment) 6/7/2018  3:05 PM   Output (mL) 70 mL 6/7/2018  5:00 AM            Urethral Catheter 06/06/18 0835 Non-latex 16 Fr. (Active)   Site Assessment Clean;Intact 6/7/2018  3:05 PM   Collection Container Urimeter 6/7/2018  3:05 PM   Securement Method secured to top of thigh w/ adhesive device 6/7/2018  3:05 PM   Catheter Care Performed yes 6/7/2018  7:04 AM   Reason for Continuing Urinary Catheterization Post operative 6/7/2018  3:05 PM   CAUTI Prevention Bundle StatLock in place w 1" slack;Intact seal between catheter & drainage tubing;Drainage bag off the floor;Green sheeting clip in use;No dependent loops or kinks;Drainage bag not overfilled (<2/3 full);Drainage bag below bladder 6/7/2018  7:04 AM   Output (mL) 30 mL 6/7/2018  2:04 PM       Physical Exam:    Neurological:   AAOx3, NAD  PERRL  5/5 throughout  Counting fingers with both eyes. Color vision present.         Significant Labs:    Recent Labs  Lab 06/11/18 0315 06/12/18  0434 06/12/18  0850   * 116*  --     135* 132*   K 3.4* 3.6  --     98  --    CO2 25 27  --    BUN 10 10  --    CREATININE 0.6 0.6  --    CALCIUM 8.9 8.5*  --    MG 2.3 2.2  --        Recent Labs  Lab 06/11/18 0315 06/12/18  0434   WBC 10.65 11.59   HGB 11.0* 11.8*   HCT 34.2* 35.1*    205     No results for input(s): LABPT, INR, APTT in the last 48 hours.  Microbiology Results (last 7 days)     ** No results found for the last 168 hours. **        All pertinent labs from the last 24 hours " have been reviewed.    Significant Diagnostics:  I have reviewed all pertinent imaging results/findings within the past 24 hours.    Assessment/Plan:     * Pituitary mass    83F with likely pituitary adenoma now s/p crani for resection.    -- Neuro stable  -- Hydrocortisone 20am 10pm   -- Pain control.  -- Zofran for nausea prn.  -- Regular diet.  -- Vitals per unit routine  -- PT/OT daily and for recs. Patient OOB >4h/day.  -- PPx: PPI, SQH, IS, SCDs, bowel regimen  -- Dispo: ok to TTF neurosurgery service                Álvaro Crespo MD  Neurosurgery  [facility]

## 2018-06-12 NOTE — ASSESSMENT & PLAN NOTE
Serum and urine osmolarity, urine sodium and creatinine ordered  Fluid restrict to 1L/24 hr  Place marie for strict I/O

## 2018-06-12 NOTE — PROGRESS NOTES
[facility]  Neurocritical Care  Progress Note    Admit Date: 6/6/2018  Service Date: 06/12/2018  Length of Stay: 6    Subjective:     Chief Complaint: Pituitary mass    History of Present Illness: Patient is a 83 year old Female of HTN who is s/p left frontotemporal craniotomy with excision of pituitary tumor with neuronavigation and microsurgery. Patient presented to NSGY clinic 5/29/18 endorsing decreased vision in her left eye for the past year. In March, she suffered a fall without LOC and had extensive facial injuries. A CT of the brain was performed that showed a pituitary tumor. MRI brain then was performed that showed a large pituitary tumor with considerable suprasellar extension. On her physical exam at the time of clinic visit, she was noted to have full ocular movements, bitemporal hemianopsia and vision decreased in her left eye to barely counting. Decision was made for excision of the adenoma.   Patient is s/p excision of tumor 6/6/18. She will be admitted to [facility] for higher level of care.     Hospital Course: 6/6: s/p left frontotemporal craniotomy with excision of pituitary tumor with neuronavigation and microsurgery; admitted to [facility]   6/7: Follow up CT head with blood in ventricles; Follow up scan scheduled today  6/8: Patient more awake and alert than prior   6/10: SG drain pulled in AM. Vomited in afternoon, CTH stable   6/12: encephalopathic this am, sodium drop to 132mmHg, no obvious development of excessive diuresis           Urine studies pending, fluid restrict, EEG x 24 hrs        Review of Systems   Unable to perform ROS: Mental status change       Objective:     Vitals:  Temp: 98.5 °F (36.9 °C)  Pulse: 78  Rhythm: normal sinus rhythm  BP: (!) 161/77  MAP (mmHg): 110  Resp: 17  SpO2: 98 %  O2 Device (Oxygen Therapy): nasal cannula    Temp  Min: 98.2 °F (36.8 °C)  Max: 99.2 °F (37.3 °C)  Pulse  Min: 71  Max: 98  BP  Min: 117/68  Max: 175/81  MAP (mmHg)  Min: 84  Max:  194  Resp  Min: 14  Max: 18  SpO2  Min: 95 %  Max: 99 %    06/11 0701 - 06/12 0700  In: 200 [P.O.:200]  Out: 1750 [Urine:1750]   Unmeasured Output  Urine Occurrence: 1  Stool Occurrence: 1  Emesis Occurrence: 0  Pad Count: 1       Physical Exam   Constitutional: She appears well-developed.   HENT:   Head: Normocephalic.   Eyes: Pupils are equal, round, and reactive to light.   Neck: No JVD present.   Cardiovascular: Regular rhythm and normal heart sounds.    Pulmonary/Chest: Effort normal and breath sounds normal.   Abdominal: Soft. Bowel sounds are normal.   Musculoskeletal: She exhibits no edema.   Neurological:   Lethargic, perseverative  No focal motor findings   Skin: Skin is warm. Capillary refill takes less than 2 seconds.         Medications:  Continuous Scheduled  amLODIPine 10 mg QHS   bacitracin  BID   DULoxetine 30 mg Daily   hydrocortisone 10 mg QHS   hydrocortisone 20 mg QAM   lisinopril 20 mg Daily   pantoprazole 40 mg Daily   PRN  hydrALAZINE 20 mg Q6H PRN   labetalol 10 mg Q10 Min PRN   oxyCODONE-acetaminophen 1 tablet Q4H PRN     Today I personally reviewed pertinent medications, lines/drains/airways, imaging, lab results, notably:    Diet  Diet NPO  Diet NPO        Assessment/Plan:     Neuro   Encephalopathy acute    24 hr EEG ordered, hold on escalation or loading AEDs        Renal/   Hyponatremia    Serum and urine osmolarity, urine sodium and creatinine ordered  Fluid restrict to 1L/24 hr  Place marie for strict I/O        Endocrine   * Pituitary mass    POD 4 s/p L frontotemporal craniotomy for excision of pituitary tumor  -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  -NSGY following   -Subgaleal drain removed this AM   -Ancef discontinued   -Repeat scan this afternoon obtained after drain removed and vomiting stable, improved IVH  --160   -hydrocortisone per NSGY  -Monitoring for DI                Prophylaxis:  Venous Thromboembolism: chemical  Stress  Ulcer: PPI  Ventilator Pneumonia: not applicable     Activity Orders          Up with assistance starting at 06/07 0600      patient requires icu for close neurologic monitoring with post operative neuro change and close I/O monitoring and frquent sodium levels  CRC> 30 min  No Order    Todd Whittington MD  Neurocritical Care  [facility]

## 2018-06-12 NOTE — PT/OT/SLP PROGRESS
Occupational Therapy      Patient Name:  Florence Everett   MRN:  [id number]    Patient not seen today secondary to change in neuro status with RN reporting pt needs to rest and stay in bed at this time  . Will follow-up tomorrow to assess if pt medically stable and appropriate to be seen by therapy.    ZEN Tyson  6/12/2018

## 2018-06-12 NOTE — PLAN OF CARE
Problem: Patient Care Overview  Goal: Plan of Care Review  Outcome: Ongoing (interventions implemented as appropriate)   06/11/18 1915   Coping/Psychosocial   Plan Of Care Reviewed With patient;family             POC reviewed with pt and pts daughter. Both state understanding.   Pt rested well this evening.   No acute neuro changes overnight. AAOx4 . Pupils 3 mm, brisk, and reactive.   OOB to bedside commode multiple times. Tolerated well.   Hydralazine given x 1 for SBP >160.   Afebrile.   See flowsheets for further assessment and VS.

## 2018-06-12 NOTE — SUBJECTIVE & OBJECTIVE
Review of Systems   Unable to perform ROS: Mental status change       Objective:     Vitals:  Temp: 98.5 °F (36.9 °C)  Pulse: 78  Rhythm: normal sinus rhythm  BP: (!) 161/77  MAP (mmHg): 110  Resp: 17  SpO2: 98 %  O2 Device (Oxygen Therapy): nasal cannula    Temp  Min: 98.2 °F (36.8 °C)  Max: 99.2 °F (37.3 °C)  Pulse  Min: 71  Max: 98  BP  Min: 117/68  Max: 175/81  MAP (mmHg)  Min: 84  Max: 194  Resp  Min: 14  Max: 18  SpO2  Min: 95 %  Max: 99 %    06/11 0701 - 06/12 0700  In: 200 [P.O.:200]  Out: 1750 [Urine:1750]   Unmeasured Output  Urine Occurrence: 1  Stool Occurrence: 1  Emesis Occurrence: 0  Pad Count: 1       Physical Exam   Constitutional: She appears well-developed.   HENT:   Head: Normocephalic.   Eyes: Pupils are equal, round, and reactive to light.   Neck: No JVD present.   Cardiovascular: Regular rhythm and normal heart sounds.    Pulmonary/Chest: Effort normal and breath sounds normal.   Abdominal: Soft. Bowel sounds are normal.   Musculoskeletal: She exhibits no edema.   Neurological:   Lethargic, perseverative  No focal motor findings   Skin: Skin is warm. Capillary refill takes less than 2 seconds.         Medications:  Continuous Scheduled  amLODIPine 10 mg QHS   bacitracin  BID   DULoxetine 30 mg Daily   hydrocortisone 10 mg QHS   hydrocortisone 20 mg QAM   lisinopril 20 mg Daily   pantoprazole 40 mg Daily   PRN  hydrALAZINE 20 mg Q6H PRN   labetalol 10 mg Q10 Min PRN   oxyCODONE-acetaminophen 1 tablet Q4H PRN     Today I personally reviewed pertinent medications, lines/drains/airways, imaging, lab results, notably:    Diet  Diet NPO  Diet NPO

## 2018-06-12 NOTE — PLAN OF CARE
Problem: Physical Therapy Goal  Goal: Physical Therapy Goal  Goals to be met by: 18    Patient will increase functional independence with mobility by performin. Supine <> sit with [place]  2. Sit to stand transfer with Modified [place]  3. Gait  x 150 feet with Supervision with or without least restrictive AD.   4. Stand for 3 minutes with [place] while performing dynamic balance activities to reduce fall risk   5. Lower extremity exercise program x20 reps per handout, with supervision     Outcome: Ongoing (interventions implemented as appropriate)  No goals met this visit; continue current POC.     Chiquita Valencia PT, DPT   2018  Pager: [phone]

## 2018-06-12 NOTE — SUBJECTIVE & OBJECTIVE
"Interval History: NAEON.      Medications:  Continuous Infusions:    Scheduled Meds:   amLODIPine  10 mg Oral QHS    bacitracin   Topical (Top) BID    DULoxetine  30 mg Oral Daily    hydrocortisone  10 mg Oral QHS    hydrocortisone  20 mg Oral QAM    lisinopril  20 mg Oral Daily    pantoprazole  40 mg Oral Daily     PRN Meds:hydrALAZINE, labetalol, oxyCODONE-acetaminophen     Review of Systems    Objective:     Weight: 71.8 kg (158 lb 4.6 oz)  Body mass index is 27.17 kg/m².  Vital Signs (Most Recent):  Temp: 98.5 °F (36.9 °C) (06/12/18 1100)  Pulse: 78 (06/12/18 1100)  Resp: 17 (06/12/18 1100)  BP: (!) 161/77 (06/12/18 1100)  SpO2: 98 % (06/12/18 1100) Vital Signs (24h Range):  Temp:  [98.2 °F (36.8 °C)-99.2 °F (37.3 °C)] 98.5 °F (36.9 °C)  Pulse:  [71-98] 78  Resp:  [14-18] 17  SpO2:  [95 %-99 %] 98 %  BP: (117-175)/(58-92) 161/77       Date 06/12/18 0700 - 06/13/18 0659   Shift 6448-8360 2338-0529 8508-9700 24 Hour Total   I  N  T  A  K  E   Shift Total  (mL/kg)       O  U  T  P  U  T   Urine  (mL/kg/hr) 150   150    Shift Total  (mL/kg) 150  (2.1)   150  (2.1)   Weight (kg) 71.8 71.8 71.8 71.8            Closed/Suction Drain 06/06/18 1333 Right Other (Comment) Accordion 10 Fr. (Active)   Site Description Unable to view 6/7/2018  3:05 PM   Dressing Type Gauze 6/7/2018  3:05 PM   Dressing Status Clean;Dry;Intact 6/7/2018  3:05 PM   Drainage Serosanguineous 6/7/2018  3:05 PM   Status Other (Comment) 6/7/2018  3:05 PM   Output (mL) 70 mL 6/7/2018  5:00 AM            Urethral Catheter 06/06/18 0835 Non-latex 16 Fr. (Active)   Site Assessment Clean;Intact 6/7/2018  3:05 PM   Collection Container Urimeter 6/7/2018  3:05 PM   Securement Method secured to top of thigh w/ adhesive device 6/7/2018  3:05 PM   Catheter Care Performed yes 6/7/2018  7:04 AM   Reason for Continuing Urinary Catheterization Post operative 6/7/2018  3:05 PM   CAUTI Prevention Bundle StatLock in place w 1" slack;Intact seal between catheter " & drainage tubing;Drainage bag off the floor;Green sheeting clip in use;No dependent loops or kinks;Drainage bag not overfilled (<2/3 full);Drainage bag below bladder 6/7/2018  7:04 AM   Output (mL) 30 mL 6/7/2018  2:04 PM       Physical Exam:    Neurological:   AAOx3, NAD  PERRL  5/5 throughout  Counting fingers with both eyes. Color vision present.         Significant Labs:    Recent Labs  Lab 06/11/18 0315 06/12/18  0434 06/12/18  0850   * 116*  --     135* 132*   K 3.4* 3.6  --     98  --    CO2 25 27  --    BUN 10 10  --    CREATININE 0.6 0.6  --    CALCIUM 8.9 8.5*  --    MG 2.3 2.2  --        Recent Labs  Lab 06/11/18 0315 06/12/18  0434   WBC 10.65 11.59   HGB 11.0* 11.8*   HCT 34.2* 35.1*    205     No results for input(s): LABPT, INR, APTT in the last 48 hours.  Microbiology Results (last 7 days)     ** No results found for the last 168 hours. **        All pertinent labs from the last 24 hours have been reviewed.    Significant Diagnostics:  I have reviewed all pertinent imaging results/findings within the past 24 hours.

## 2018-06-13 LAB
ANION GAP SERPL CALC-SCNC: 12 MMOL/L
BASOPHILS # BLD AUTO: 0.02 K/UL
BASOPHILS NFR BLD: 0.2 %
BUN SERPL-MCNC: 12 MG/DL
CALCIUM SERPL-MCNC: 8.9 MG/DL
CHLORIDE SERPL-SCNC: 97 MMOL/L
CO2 SERPL-SCNC: 28 MMOL/L
CORTIS SERPL-MCNC: 18.7 UG/DL
CREAT SERPL-MCNC: 0.7 MG/DL
DIFFERENTIAL METHOD: ABNORMAL
EOSINOPHIL # BLD AUTO: 0 K/UL
EOSINOPHIL NFR BLD: 0.2 %
ERYTHROCYTE [DISTWIDTH] IN BLOOD BY AUTOMATED COUNT: 13.9 %
EST. GFR  (AFRICAN AMERICAN): >60 ML/MIN/1.73 M^2
EST. GFR  (NON AFRICAN AMERICAN): >60 ML/MIN/1.73 M^2
FSH SERPL-ACNC: 2.1 MIU/ML
GLUCOSE SERPL-MCNC: 115 MG/DL
HCT VFR BLD AUTO: 34.5 %
HGB BLD-MCNC: 11.4 G/DL
IMM GRANULOCYTES # BLD AUTO: 0.18 K/UL
IMM GRANULOCYTES NFR BLD AUTO: 1.6 %
LYMPHOCYTES # BLD AUTO: 1.6 K/UL
LYMPHOCYTES NFR BLD: 13.8 %
MAGNESIUM SERPL-MCNC: 2.2 MG/DL
MCH RBC QN AUTO: 30.2 PG
MCHC RBC AUTO-ENTMCNC: 33 G/DL
MCV RBC AUTO: 92 FL
MONOCYTES # BLD AUTO: 1.7 K/UL
MONOCYTES NFR BLD: 14.9 %
NEUTROPHILS # BLD AUTO: 8 K/UL
NEUTROPHILS NFR BLD: 69.3 %
NRBC BLD-RTO: 0 /100 WBC
PLATELET # BLD AUTO: 228 K/UL
PMV BLD AUTO: 10.8 FL
POTASSIUM SERPL-SCNC: 3.2 MMOL/L
PROLACTIN SERPL IA-MCNC: 12.4 NG/ML
RBC # BLD AUTO: 3.77 M/UL
SODIUM SERPL-SCNC: 134 MMOL/L
SODIUM SERPL-SCNC: 136 MMOL/L
SODIUM SERPL-SCNC: 137 MMOL/L
TSH SERPL DL<=0.005 MIU/L-ACNC: 0.56 UIU/ML
WBC # BLD AUTO: 11.46 K/UL

## 2018-06-13 PROCEDURE — 84443 ASSAY THYROID STIM HORMONE: CPT

## 2018-06-13 PROCEDURE — 25000003 PHARM REV CODE 250: Performed by: STUDENT IN AN ORGANIZED HEALTH CARE EDUCATION/TRAINING PROGRAM

## 2018-06-13 PROCEDURE — 20600001 HC STEP DOWN PRIVATE ROOM

## 2018-06-13 PROCEDURE — 83735 ASSAY OF MAGNESIUM: CPT

## 2018-06-13 PROCEDURE — 85025 COMPLETE CBC W/AUTO DIFF WBC: CPT

## 2018-06-13 PROCEDURE — 95951 HC EEG MONITORING/VIDEO RECORD: CPT

## 2018-06-13 PROCEDURE — 83001 ASSAY OF GONADOTROPIN (FSH): CPT

## 2018-06-13 PROCEDURE — 25000003 PHARM REV CODE 250: Performed by: NURSE PRACTITIONER

## 2018-06-13 PROCEDURE — 99233 SBSQ HOSP IP/OBS HIGH 50: CPT | Mod: ,,, | Performed by: NURSE PRACTITIONER

## 2018-06-13 PROCEDURE — 84295 ASSAY OF SERUM SODIUM: CPT | Mod: 91

## 2018-06-13 PROCEDURE — 84295 ASSAY OF SERUM SODIUM: CPT

## 2018-06-13 PROCEDURE — 25000003 PHARM REV CODE 250: Performed by: PHYSICIAN ASSISTANT

## 2018-06-13 PROCEDURE — 82533 TOTAL CORTISOL: CPT

## 2018-06-13 PROCEDURE — 97110 THERAPEUTIC EXERCISES: CPT

## 2018-06-13 PROCEDURE — 84146 ASSAY OF PROLACTIN: CPT

## 2018-06-13 PROCEDURE — 63600175 PHARM REV CODE 636 W HCPCS: Performed by: NURSE PRACTITIONER

## 2018-06-13 PROCEDURE — 80048 BASIC METABOLIC PNL TOTAL CA: CPT

## 2018-06-13 RX ORDER — HEPARIN SODIUM 5000 [USP'U]/ML
5000 INJECTION, SOLUTION INTRAVENOUS; SUBCUTANEOUS EVERY 8 HOURS
Status: DISCONTINUED | OUTPATIENT
Start: 2018-06-13 | End: 2018-06-15 | Stop reason: HOSPADM

## 2018-06-13 RX ADMIN — SODIUM CHLORIDE TAB 1 GM 1 G: 1 TAB at 06:06

## 2018-06-13 RX ADMIN — DULOXETINE 30 MG: 30 CAPSULE, DELAYED RELEASE ORAL at 09:06

## 2018-06-13 RX ADMIN — HYDROCORTISONE 20 MG: 10 TABLET ORAL at 07:06

## 2018-06-13 RX ADMIN — AMLODIPINE BESYLATE 10 MG: 10 TABLET ORAL at 09:06

## 2018-06-13 RX ADMIN — SODIUM CHLORIDE TAB 1 GM 1 G: 1 TAB at 09:06

## 2018-06-13 RX ADMIN — LISINOPRIL 20 MG: 20 TABLET ORAL at 09:06

## 2018-06-13 RX ADMIN — BACITRACIN: 500 OINTMENT TOPICAL at 09:06

## 2018-06-13 RX ADMIN — HEPARIN SODIUM 5000 UNITS: 5000 INJECTION, SOLUTION INTRAVENOUS; SUBCUTANEOUS at 09:06

## 2018-06-13 RX ADMIN — HEPARIN SODIUM 5000 UNITS: 5000 INJECTION, SOLUTION INTRAVENOUS; SUBCUTANEOUS at 10:06

## 2018-06-13 RX ADMIN — PANTOPRAZOLE SODIUM 40 MG: 40 TABLET, DELAYED RELEASE ORAL at 09:06

## 2018-06-13 NOTE — ASSESSMENT & PLAN NOTE
--160, required multiple PRN IVP antihypertensives  -Continue home Lisinopril 20 mg   - amlodipine 10 mg daily

## 2018-06-13 NOTE — SUBJECTIVE & OBJECTIVE
Interval History:    -Exam improved,      Review of Systems   Respiratory: Negative for apnea, cough, choking, chest tightness, shortness of breath, wheezing and stridor.    Cardiovascular: Negative for chest pain, palpitations and leg swelling.   Gastrointestinal: Negative for abdominal distention, abdominal pain, nausea and vomiting.   Genitourinary: Negative for difficulty urinating and dysuria.   Neurological: Negative for headaches.   Psychiatric/Behavioral: Negative for agitation and confusion.       Objective:     Vitals:  Temp: 98.8 °F (37.1 °C)  Pulse: 80  Rhythm: normal sinus rhythm  BP: 132/80  MAP (mmHg): 102  Resp: 14  SpO2: 98 %  O2 Device (Oxygen Therapy): nasal cannula    Temp  Min: 98 °F (36.7 °C)  Max: 99 °F (37.2 °C)  Pulse  Min: 76  Max: 94  BP  Min: 117/59  Max: 162/81  MAP (mmHg)  Min: 82  Max: 114  Resp  Min: 12  Max: 18  SpO2  Min: 94 %  Max: 99 %    06/12 0701 - 06/13 0700  In: 400 [P.O.:400]  Out: 1135 [Urine:1135]   Unmeasured Output  Urine Occurrence: 1  Stool Occurrence: 1  Emesis Occurrence: 0  Pad Count: 1       Physical Exam   Constitutional: She appears well-developed.   Cardiovascular: Normal rate, regular rhythm, normal heart sounds and intact distal pulses.    Pulmonary/Chest: Effort normal and breath sounds normal.   Abdominal: Soft. Bowel sounds are normal.   Neurological:   E4 V5 M6  AAO x 3  PERRlA  FERNANDEZ, follows commands  Strength symetrical   Skin: Skin is warm. Capillary refill takes less than 2 seconds.   Vitals reviewed.      Medications:  Continuous Scheduled  amLODIPine 10 mg QHS   bacitracin  BID   DULoxetine 30 mg Daily   heparin (porcine) 5,000 Units Q8H   hydrocortisone 10 mg QHS   hydrocortisone 20 mg QAM   lisinopril 20 mg Daily   pantoprazole 40 mg Daily   PRN  hydrALAZINE 20 mg Q6H PRN   labetalol 10 mg Q10 Min PRN   oxyCODONE-acetaminophen 1 tablet Q4H PRN     Today I personally reviewed pertinent medications, lines/drains/airways, imaging, cardiology, lab  results, microbiology results,   Diet  Diet Adult Regular (IDDSI Level 7) Fluid - 1000mL  Diet Adult Regular (IDDSI Level 7) Fluid - 1000mL

## 2018-06-13 NOTE — PHYSICIAN QUERY
PT Name: Florence Everett  MR #: [id number]    Physician Query Form - Neurological Condition Clarification       CDS: Leydi Whitehead RN, CCDS       Contact information: [email or web address]    This form is a permanent document in the medical record.     Query Date: June 13, 2018    By submitting this query, we are merely seeking further clarification of documentation. Please utilize your independent clinical judgment when addressing the question(s) below.    The Medical record contains the following:   Indicators   Supporting Clinical Findings Location in Medical Record   X AMS, Confusion, LOC, etc. Mental status change   Lethargic, perseverative 6/12-NCC PN ([place])   X Acute / Chronic Illness Pituitary mass, Hyponatremia, HTN  POD 4 s/p L frontotemporal craniotomy for excision of pituitary tumor 6/12-NCC PN ([place])    Radiology Findings     X Electrolyte Imbalance 6/12: encephalopathic this am, sodium drop to 132mmHg, no obvious development of excessive diuresis           6/12-NCC PN ([place])    Medication     X Treatment Urine studies pending, fluid restrict, EEG x 24 hrs  24 hr EEG ordered, hold on escalation or loading AEDs   6/12-NCC PN ([place])   X Other Encephalopathy acute   6/12-NCC PN ([place])     Provider, please specify the diagnosis or diagnoses associated with above clinical findings.      [x  ] Metabolic Encephalopathy    [  ] Other Encephalopathy    [  ] Unspecified Encephalopathy    [  ] Other (please specify): _____________________________________    [  ] Clinically Undetermined      Please document in your progress notes daily for the duration of treatment until resolved, and  include in your discharge summary.

## 2018-06-13 NOTE — SUBJECTIVE & OBJECTIVE
"Interval History: Intermittent episodes of confusion overnight.      Medications:  Continuous Infusions:    Scheduled Meds:   amLODIPine  10 mg Oral QHS    bacitracin   Topical (Top) BID    DULoxetine  30 mg Oral Daily    hydrocortisone  10 mg Oral QHS    hydrocortisone  20 mg Oral QAM    lisinopril  20 mg Oral Daily    pantoprazole  40 mg Oral Daily     PRN Meds:hydrALAZINE, labetalol, oxyCODONE-acetaminophen     Review of Systems    Objective:     Weight: 71.8 kg (158 lb 4.6 oz)  Body mass index is 27.17 kg/m².  Vital Signs (Most Recent):  Temp: 98.8 °F (37.1 °C) (06/13/18 0701)  Pulse: 77 (06/13/18 0800)  Resp: 14 (06/13/18 0600)  BP: 137/72 (06/13/18 0800)  SpO2: 95 % (06/13/18 0800) Vital Signs (24h Range):  Temp:  [98 °F (36.7 °C)-99 °F (37.2 °C)] 98.8 °F (37.1 °C)  Pulse:  [76-94] 77  Resp:  [12-18] 14  SpO2:  [94 %-99 %] 95 %  BP: (117-162)/(59-84) 137/72       Date 06/13/18 0700 - 06/14/18 0659   Shift 7323-3386 5599-2436 7776-0512 24 Hour Total   I  N  T  A  K  E   Shift Total  (mL/kg)       O  U  T  P  U  T   Urine  (mL/kg/hr) 125   125    Shift Total  (mL/kg) 125  (1.7)   125  (1.7)   Weight (kg) 71.8 71.8 71.8 71.8            Closed/Suction Drain 06/06/18 1333 Right Other (Comment) Accordion 10 Fr. (Active)   Site Description Unable to view 6/7/2018  3:05 PM   Dressing Type Gauze 6/7/2018  3:05 PM   Dressing Status Clean;Dry;Intact 6/7/2018  3:05 PM   Drainage Serosanguineous 6/7/2018  3:05 PM   Status Other (Comment) 6/7/2018  3:05 PM   Output (mL) 70 mL 6/7/2018  5:00 AM            Urethral Catheter 06/06/18 0835 Non-latex 16 Fr. (Active)   Site Assessment Clean;Intact 6/7/2018  3:05 PM   Collection Container Urimeter 6/7/2018  3:05 PM   Securement Method secured to top of thigh w/ adhesive device 6/7/2018  3:05 PM   Catheter Care Performed yes 6/7/2018  7:04 AM   Reason for Continuing Urinary Catheterization Post operative 6/7/2018  3:05 PM   CAUTI Prevention Bundle StatLock in place w 1" " slack;Intact seal between catheter & drainage tubing;Drainage bag off the floor;Green sheeting clip in use;No dependent loops or kinks;Drainage bag not overfilled (<2/3 full);Drainage bag below bladder 6/7/2018  7:04 AM   Output (mL) 30 mL 6/7/2018  2:04 PM       Physical Exam:    Neurological:   AAOx3, NAD  PERRL  5/5 throughout  Counting fingers with both eyes. Color vision present.     Significant Labs:    Recent Labs  Lab 06/12/18  0434  06/12/18  1441 06/12/18  2221 06/13/18  0204   *  --   --   --  115*   *  < > 136 135* 137  137   K 3.6  --   --   --  3.2*   CL 98  --   --   --  97   CO2 27  --   --   --  28   BUN 10  --   --   --  12   CREATININE 0.6  --   --   --  0.7   CALCIUM 8.5*  --   --   --  8.9   MG 2.2  --   --   --  2.2   < > = values in this interval not displayed.    Recent Labs  Lab 06/12/18  0434 06/13/18  0204   WBC 11.59 11.46   HGB 11.8* 11.4*   HCT 35.1* 34.5*    228     No results for input(s): LABPT, INR, APTT in the last 48 hours.  Microbiology Results (last 7 days)     ** No results found for the last 168 hours. **        All pertinent labs from the last 24 hours have been reviewed.    Significant Diagnostics:  I have reviewed all pertinent imaging results/findings within the past 24 hours.

## 2018-06-13 NOTE — ASSESSMENT & PLAN NOTE
24 hr EEG ordered, hold on AEDs  No reports of seizure overnight  Exam improved with serum Na improved.

## 2018-06-13 NOTE — PLAN OF CARE
Problem: Patient Care Overview  Goal: Plan of Care Review  Outcome: Ongoing (interventions implemented as appropriate)  POC reviewed with pt and family at 1400. Pt verbalized understanding. Questions and concerns addressed. No acute events today. Diet advanced to regular with 1000 mL fluid restriction. EEG discontinued. Pt more alert today, GCS 15. Transfer orders in; waiting for placement. Pt progressing toward goals. Will continue to monitor. See flowsheets for full assessment and VS info.

## 2018-06-13 NOTE — ASSESSMENT & PLAN NOTE
83F with likely pituitary adenoma now s/p crani for resection.    -- Neuro stable  -- Hydrocortisone 20am 10pm   -- Check pituitary endocrine labs.  -- Pain control.  -- Zofran for nausea prn.  -- Regular diet.  -- Vitals per unit routine  -- Medical management per NCC.  -- PT/OT daily and for recs. Patient OOB >4h/day.  -- PPx: PPI, SQH, IS, SCDs, bowel regimen  -- Dispo: ok to [facility] neurosurgery service

## 2018-06-13 NOTE — PROCEDURES
DATE OF SERVICE:  06/12/2018 06/13/2018    ICU EEG/VIDEO MONITORING REPORT    METHODOLOGY:  Electroencephalographic (EEG) is recorded with electrodes placed   according to the International 10-20 placement system.  Thirty two (32) channels   of digital signal (sampling rate of 512/sec), including T1 and T2, were   simultaneously recorded from the scalp and may include EKG, EMG, and/or eye   monitors.  Recording band pass was 0.1 to 512 Hz.  Digital video recording of   the patient is simultaneously recorded with the EEG.  The patient is instructed   to report clinical symptoms which may occur during the recording session.  EEG   and video recording are stored and archived in digital format.  Activation   procedures, which include photic stimulation, hyperventilation and instructing   patients to perform simple tasks, are done in selected patients.    The EEG is displayed on a monitor screen and can be reviewed using different   montages.  Computer-assisted analysis is employed to detect spike and   electrographic seizure activity.  The entire record is submitted for computer   analysis.  The entire recording is visually reviewed, and the times identified   by computer analysis as being spikes or seizures are reviewed again.    Compressed spectral analysis (CSA) is also performed on the activity recorded   from each individual channel.  This is displayed as a power display of   frequencies from 0 to 30 Hz over time.  The CSA is reviewed looking for   asymmetries in power between homologous areas of the scalp, then compared with   the original EEG recording.    [employer] software was also utilized in the review of this study.  This software   suite analyzes the EEG recording in multiple domains.  Coherence and rhythmicity   are computed to identify EEG sections which may contain organized seizures.    Each channel undergoes analysis to detect the presence of spike and sharp waves   which have special and morphological  characteristics of epileptic activity.  The   routine EEG recording is converted from special into frequency domain.  This is   then displayed comparing homologous areas to identify areas of significant   asymmetry.  Algorithm to identify non-cortically generated artifact is used to   separate artifact from the EEG.    RECORDING TIMES:  Duration is 21 hours and 24 minutes.  This study begins on   06/12/2018 at 17:43 and ends on 06/13/2018 at 15:20.    EEG FINDINGS:  This study consists of a medium amplitude mixed frequency   background with overall disorganized appearance and significant fluctuations.    Some pages are dominated by a mix of theta and faster frequencies including   alpha and beta activity with elements of rhythmic delta interspersed well.    Amplitudes are somewhat higher in the left hemisphere than the right with   somewhat more delta activity also seen in the left hemisphere than the right.    There are also portions of the record in which faster activity abates and 2 Hz   delta activity is seen with lesser degree of overlying frequencies.  As the   record progresses, higher amplitudes and sharper morphologies are frequently   noted in the left parasagittal region suggesting a breach artifact near there.    The record continues to fluctuate in terms of most of the superimposed fast   activity on and off throughout this entire recording.  No clear epileptiform   disturbances or seizures are seen at any point.  There is no major trend or   evolution in the record noted and many pages do contain a significant amount of   faster activity suggesting more mild degree of encephalopathy.    INTERPRETATION:  Abnormal EEG due to fluctuating moderate encephalopathy as   described above with hemispheric asymmetry and an apparent breach rhythm noted   in the left parasagittal area.  No seizures or epileptiform disturbances were   seen.      NBB/IN  dd: 06/13/2018 14:53:45 (CDT)  td: 06/13/2018 15:54:57 (CDT)   Doc ID   #[id number]  Job ID #[id number]    CC:

## 2018-06-13 NOTE — PLAN OF CARE
Problem: Patient Care Overview  Goal: Plan of Care Review  Outcome: Ongoing (interventions implemented as appropriate)   06/12/18 2101   Coping/Psychosocial   Plan Of Care Reviewed With patient;daughter             POC reviewed with pt and pts daughter. Both state understanding.   Pt rested well this evening.   Continuous EEG cap remains in place.   Q6 Na levels obtained.   Afebrile.   See flowsheets for full assessment and VS.

## 2018-06-13 NOTE — ASSESSMENT & PLAN NOTE
Serum and urine osmolarity, urine sodium and creatinine, does not appears CSW.   Mild SIADH as serum Na iporved with fluid restriction  Fluid restrict to 1L/24 hr  Place marie for strict I/O  Will follow daily as exam and serum Na stablized

## 2018-06-13 NOTE — ASSESSMENT & PLAN NOTE
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

## 2018-06-13 NOTE — PLAN OF CARE
Problem: Occupational Therapy Goal  Goal: Occupational Therapy Goal  Goals to be met by: 6/16/2018     Patient will increase functional independence with ADLs by performing:    UE Dressing with [place].  LE Dressing with [place].  Grooming while seated at sink with Supervision.  Toileting from toilet with Minimal Assistance for hygiene and clothing management.   Toilet transfer to toilet with Stand-by Assistance.     Continue OT POC     Comments: Pio Yo OTR/L  6/13/2018

## 2018-06-13 NOTE — PROGRESS NOTES
2140: Pt confused with garbled speech. Follows simple commands at times. Red button on EEG pushed and NCC notified.     Epilepsy notified by NCC. No seizure activity present.   Q6 Na drawn early.   Pupils 3 mm, equal, and reactive.   No facial droop or drift in any extremities.   No c/o headache.   NCC spoke with family. No new orders at this time.   Will continue to monitor.

## 2018-06-13 NOTE — PROGRESS NOTES
[facility]  Neurocritical Care  Progress Note    Admit Date: 6/6/2018  Service Date: 06/13/2018  Length of Stay: 7    Subjective:     Chief Complaint: Pituitary mass    History of Present Illness: Patient is a 83 year old Female of HTN who is s/p left frontotemporal craniotomy with excision of pituitary tumor with neuronavigation and microsurgery. Patient presented to NSGY clinic 5/29/18 endorsing decreased vision in her left eye for the past year. In March, she suffered a fall without LOC and had extensive facial injuries. A CT of the brain was performed that showed a pituitary tumor. MRI brain then was performed that showed a large pituitary tumor with considerable suprasellar extension. On her physical exam at the time of clinic visit, she was noted to have full ocular movements, bitemporal hemianopsia and vision decreased in her left eye to barely counting. Decision was made for excision of the adenoma.   Patient is s/p excision of tumor 6/6/18. She will be admitted to [facility] for higher level of care.     Hospital Course: 6/6: s/p left frontotemporal craniotomy with excision of pituitary tumor with neuronavigation and microsurgery; admitted to [facility]   6/7: Follow up CT head with blood in ventricles; Follow up scan scheduled today  6/8: Patient more awake and alert than prior   6/10: SG drain pulled in AM. Vomited in afternoon, CTH stable   6/12: encephalopathic this am, sodium drop to 132mmHg, no obvious development of excessive diuresis           Urine studies pending, fluid restrict, EEG x 24 hrs  6/13: Exam improved. Na improved with fluid restriction. Pending step down to NSGY service. DVT ppx started,     Interval History:    -Exam improved,      Review of Systems   Respiratory: Negative for apnea, cough, choking, chest tightness, shortness of breath, wheezing and stridor.    Cardiovascular: Negative for chest pain, palpitations and leg swelling.   Gastrointestinal: Negative for abdominal  distention, abdominal pain, nausea and vomiting.   Genitourinary: Negative for difficulty urinating and dysuria.   Neurological: Negative for headaches.   Psychiatric/Behavioral: Negative for agitation and confusion.       Objective:     Vitals:  Temp: 98.8 °F (37.1 °C)  Pulse: 80  Rhythm: normal sinus rhythm  BP: 132/80  MAP (mmHg): 102  Resp: 14  SpO2: 98 %  O2 Device (Oxygen Therapy): nasal cannula    Temp  Min: 98 °F (36.7 °C)  Max: 99 °F (37.2 °C)  Pulse  Min: 76  Max: 94  BP  Min: 117/59  Max: 162/81  MAP (mmHg)  Min: 82  Max: 114  Resp  Min: 12  Max: 18  SpO2  Min: 94 %  Max: 99 %    06/12 0701 - 06/13 0700  In: 400 [P.O.:400]  Out: 1135 [Urine:1135]   Unmeasured Output  Urine Occurrence: 1  Stool Occurrence: 1  Emesis Occurrence: 0  Pad Count: 1       Physical Exam   Constitutional: She appears well-developed.   Cardiovascular: Normal rate, regular rhythm, normal heart sounds and intact distal pulses.    Pulmonary/Chest: Effort normal and breath sounds normal.   Abdominal: Soft. Bowel sounds are normal.   Neurological:   E4 V5 M6  AAO x 3  PERRLA, EOMI. Left eye ptosis  FERNANDEZ, follows commands  Strength symetrical   Skin: Skin is warm. Capillary refill takes less than 2 seconds.   Vitals reviewed.      Medications:  Continuous Scheduled  amLODIPine 10 mg QHS   bacitracin  BID   DULoxetine 30 mg Daily   heparin (porcine) 5,000 Units Q8H   hydrocortisone 10 mg QHS   hydrocortisone 20 mg QAM   lisinopril 20 mg Daily   pantoprazole 40 mg Daily   PRN  hydrALAZINE 20 mg Q6H PRN   labetalol 10 mg Q10 Min PRN   oxyCODONE-acetaminophen 1 tablet Q4H PRN     Today I personally reviewed pertinent medications, lines/drains/airways, imaging, cardiology, lab results, microbiology results,   Diet  Diet Adult Regular (IDDSI Level 7) Fluid - 1000mL  Diet Adult Regular (IDDSI Level 7) Fluid - 1000mL          Assessment/Plan:     Neuro   Encephalopathy acute    24 hr EEG ordered, hold on AEDs  No reports of seizure  overnight  Exam improved with serum Na improved.         IVH (intraventricular hemorrhage)    Present post-operatively   - Repeat serial scans stable         Cardiac/Vascular   Essential hypertension    --160, required multiple PRN IVP antihypertensives  -Continue home Lisinopril 20 mg   - amlodipine 10 mg daily         Renal/   Hyponatremia    Serum and urine osmolarity, urine sodium and creatinine, does not appears CSW.   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     Ajay Starkey NP  Neurocritical Care  [facility]

## 2018-06-13 NOTE — PT/OT/SLP PROGRESS
Occupational Therapy   Treatment    Name: Florence Everett  MRN: [id number]  Admitting Diagnosis:  Pituitary mass  7 Days Post-Op    Recommendations:     Discharge Recommendations: rehabilitation facility  Discharge Equipment Recommendations:  walker, rolling  Barriers to discharge:  Decreased caregiver support    Subjective     Communicated with: RN prior to session.  Pain/Comfort:  · Pain Rating 1: 0/10  · Pain Rating Post-Intervention 1: 0/10    Patients cultural, spiritual, Muslim conflicts given the current situation:      Objective:     Patient found with: blood pressure cuff, pulse ox (continuous), peripheral IV, telemetry, oxygen    General Precautions: Standard, fall   Orthopedic Precautions:N/A   Braces: N/A     Occupational Performance:    Bed Mobility:    · Patient completed Rolling/Turning to Left with  supervision  · Patient completed Scooting/Bridging with supervision  · Patient completed Supine to Sit with supervision  · Patient completed Sit to Supine with supervision     Functional Mobility/Transfers:  · Patient completed Sit <> Stand Transfer with stand by assistance  with  no assistive device   · Functional Mobility: did not perform 2* EEG monitoring.     Patient left HOB elevated with all lines intact, call button in reach and RN notified    [facility] 6 Click:  [facility] Total Score: 20    Treatment & Education:  Pt performed BUE strengthening of shoulder press, chest press, and bicep curls all for 3x10 reps.   Pt performed sit<>stand for 10 reps from EOB at sba w/o AD.  Education:    Assessment:     Florence Everett is a 83 y.o. female with a medical diagnosis of Pituitary mass.  She presents with impairments listed below. Pt displayed decreased endurance w/ exercise as evidence of pt's HR elevated into 120s, but HR would decrease w/ rest break. Pt would benefit from skilled OT services to improve independence and overall occupational functioning.      Performance deficits affecting function are  weakness, impaired endurance, impaired self care skills, impaired functional mobilty, gait instability, impaired balance, decreased upper extremity function, decreased lower extremity function, impaired cardiopulmonary response to activity.      Rehab Prognosis:  good; patient would benefit from acute skilled OT services to address these deficits and reach maximum level of function.       Plan:     Patient to be seen 4 x/week to address the above listed problems via self-care/home management, therapeutic activities, therapeutic exercises, neuromuscular re-education  · Plan of Care Expires: 07/09/18  · Plan of Care Reviewed with: patient, daughter    This Plan of care has been discussed with the patient who was involved in its development and understands and is in agreement with the identified goals and treatment plan    GOALS:    Occupational Therapy Goals        Problem: Occupational Therapy Goal    Goal Priority Disciplines Outcome Interventions   Occupational Therapy Goal     OT, PT/OT     Description:  Goals to be met by: 6/16/2018     Patient will increase functional independence with ADLs by performing:    UE Dressing with [place].  LE Dressing with [place].  Grooming while seated at sink with Supervision.  Toileting from toilet with Minimal Assistance for hygiene and clothing management.   Toilet transfer to toilet with Stand-by Assistance.                      Time Tracking:     OT Date of Treatment: 06/13/18  OT Start Time: 1412  OT Stop Time: 1431  OT Total Time (min): 19 min    Billable Minutes:Therapeutic Exercise 15 minutes    Pio Yo OT  6/13/2018

## 2018-06-13 NOTE — PROGRESS NOTES
[facility]  Neurosurgery  Progress Note    Subjective:     History of Present Illness: Florence Everett is a 83 y.o. female w/ a significant PMHx of HTN, GERD, and pituitary tumor found after patient suffered from a fall and underwent a CT scan. MRI was then done which showed large pituitary tumor w/ extension into suprasellar region. On exam there is left optic pallor. She shows bitemporal hemianopsia and vision is decreased to barely finger counting in the left eye.     MRI of the brain was done at [facility] in [place] on 04/05/18. There is a large lobulated pituitary tumor, which extends on to the planum sphenoidale and up to the third ventricle. The right optic nerve is displaced laterally. The left optic nerve is not well seen. The tumor was measured as greater than 3 cm.     Patient now presents for surgery. No new complaints.    Post-Op Info:  Procedure(s) (LRB):  CRANIOTOMY, USING FRAMELESS STEREOTAXY (STEALTH) (Left)   7 Days Post-Op     Interval History: Intermittent episodes of confusion overnight.      Medications:  Continuous Infusions:    Scheduled Meds:   amLODIPine  10 mg Oral QHS    bacitracin   Topical (Top) BID    DULoxetine  30 mg Oral Daily    hydrocortisone  10 mg Oral QHS    hydrocortisone  20 mg Oral QAM    lisinopril  20 mg Oral Daily    pantoprazole  40 mg Oral Daily     PRN Meds:hydrALAZINE, labetalol, oxyCODONE-acetaminophen     Review of Systems    Objective:     Weight: 71.8 kg (158 lb 4.6 oz)  Body mass index is 27.17 kg/m².  Vital Signs (Most Recent):  Temp: 98.8 °F (37.1 °C) (06/13/18 0701)  Pulse: 77 (06/13/18 0800)  Resp: 14 (06/13/18 0600)  BP: 137/72 (06/13/18 0800)  SpO2: 95 % (06/13/18 0800) Vital Signs (24h Range):  Temp:  [98 °F (36.7 °C)-99 °F (37.2 °C)] 98.8 °F (37.1 °C)  Pulse:  [76-94] 77  Resp:  [12-18] 14  SpO2:  [94 %-99 %] 95 %  BP: (117-162)/(59-84) 137/72       Date 06/13/18 0700 - 06/14/18 0659   [facility] [id number]  "[id number] 24 Hour Total   I  N  T  A  K  E   Shift Total  (mL/kg)       O  U  T  P  U  T   Urine  (mL/kg/hr) 125   125    Shift Total  (mL/kg) 125  (1.7)   125  (1.7)   Weight (kg) 71.8 71.8 71.8 71.8            Closed/Suction Drain 06/06/18 1333 Right Other (Comment) Accordion 10 Fr. (Active)   Site Description Unable to view 6/7/2018  3:05 PM   Dressing Type Gauze 6/7/2018  3:05 PM   Dressing Status Clean;Dry;Intact 6/7/2018  3:05 PM   Drainage Serosanguineous 6/7/2018  3:05 PM   Status Other (Comment) 6/7/2018  3:05 PM   Output (mL) 70 mL 6/7/2018  5:00 AM            Urethral Catheter 06/06/18 0835 Non-latex 16 Fr. (Active)   Site Assessment Clean;Intact 6/7/2018  3:05 PM   Collection Container Urimeter 6/7/2018  3:05 PM   Securement Method secured to top of thigh w/ adhesive device 6/7/2018  3:05 PM   Catheter Care Performed yes 6/7/2018  7:04 AM   Reason for Continuing Urinary Catheterization Post operative 6/7/2018  3:05 PM   CAUTI Prevention Bundle StatLock in place w 1" slack;Intact seal between catheter & drainage tubing;Drainage bag off the floor;Green sheeting clip in use;No dependent loops or kinks;Drainage bag not overfilled (<2/3 full);Drainage bag below bladder 6/7/2018  7:04 AM   Output (mL) 30 mL 6/7/2018  2:04 PM       Physical Exam:    Neurological:   AAOx3, NAD  PERRL  5/5 throughout  Counting fingers with both eyes. Color vision present.     Significant Labs:    Recent Labs  Lab 06/12/18  0434  06/12/18  1441 06/12/18  2221 06/13/18  0204   *  --   --   --  115*   *  < > 136 135* 137  137   K 3.6  --   --   --  3.2*   CL 98  --   --   --  97   CO2 27  --   --   --  28   BUN 10  --   --   --  12   CREATININE 0.6  --   --   --  0.7   CALCIUM 8.5*  --   --   --  8.9   MG 2.2  --   --   --  2.2   < > = values in this interval not displayed.    Recent Labs  Lab 06/12/18  0434 06/13/18  0204   WBC 11.59 11.46   HGB 11.8* 11.4*   HCT 35.1* 34.5*    228     No results for " input(s): LABPT, INR, APTT in the last 48 hours.  Microbiology Results (last 7 days)     ** No results found for the last 168 hours. **        All pertinent labs from the last 24 hours have been reviewed.    Significant Diagnostics:  I have reviewed all pertinent imaging results/findings within the past 24 hours.    Assessment/Plan:     * Pituitary mass    83F with likely pituitary adenoma now s/p crani for resection.    -- Neuro stable  -- Hydrocortisone 20am 10pm   -- Check pituitary endocrine labs.  -- Pain control.  -- Zofran for nausea prn.  -- Regular diet.  -- Vitals per unit routine  -- Medical management per NCC.  -- PT/OT daily and for recs. Patient OOB >4h/day.  -- PPx: PPI, SQH, IS, SCDs, bowel regimen  -- Dispo: ok to TTF neurosurgery service                Álvaro Crespo MD  Neurosurgery  [facility]

## 2018-06-14 LAB
ANION GAP SERPL CALC-SCNC: 9 MMOL/L
BASOPHILS # BLD AUTO: 0.02 K/UL
BASOPHILS NFR BLD: 0.2 %
BUN SERPL-MCNC: 11 MG/DL
CALCIUM SERPL-MCNC: 9.1 MG/DL
CHLORIDE SERPL-SCNC: 97 MMOL/L
CO2 SERPL-SCNC: 30 MMOL/L
CREAT SERPL-MCNC: 0.7 MG/DL
DIFFERENTIAL METHOD: ABNORMAL
EOSINOPHIL # BLD AUTO: 0 K/UL
EOSINOPHIL NFR BLD: 0.4 %
ERYTHROCYTE [DISTWIDTH] IN BLOOD BY AUTOMATED COUNT: 13.8 %
EST. GFR  (AFRICAN AMERICAN): >60 ML/MIN/1.73 M^2
EST. GFR  (NON AFRICAN AMERICAN): >60 ML/MIN/1.73 M^2
GLUCOSE SERPL-MCNC: 105 MG/DL
HCT VFR BLD AUTO: 37 %
HGB BLD-MCNC: 12.4 G/DL
IMM GRANULOCYTES # BLD AUTO: 0.18 K/UL
IMM GRANULOCYTES NFR BLD AUTO: 1.7 %
LYMPHOCYTES # BLD AUTO: 1.9 K/UL
LYMPHOCYTES NFR BLD: 17.8 %
MCH RBC QN AUTO: 30.4 PG
MCHC RBC AUTO-ENTMCNC: 33.5 G/DL
MCV RBC AUTO: 91 FL
MONOCYTES # BLD AUTO: 1.4 K/UL
MONOCYTES NFR BLD: 13.3 %
NEUTROPHILS # BLD AUTO: 7 K/UL
NEUTROPHILS NFR BLD: 66.6 %
NRBC BLD-RTO: 0 /100 WBC
PLATELET # BLD AUTO: 290 K/UL
PMV BLD AUTO: 11.1 FL
POTASSIUM SERPL-SCNC: 3.1 MMOL/L
RBC # BLD AUTO: 4.08 M/UL
SODIUM SERPL-SCNC: 136 MMOL/L
WBC # BLD AUTO: 10.49 K/UL

## 2018-06-14 PROCEDURE — 36415 COLL VENOUS BLD VENIPUNCTURE: CPT

## 2018-06-14 PROCEDURE — 63600175 PHARM REV CODE 636 W HCPCS: Performed by: NURSE PRACTITIONER

## 2018-06-14 PROCEDURE — 97110 THERAPEUTIC EXERCISES: CPT

## 2018-06-14 PROCEDURE — 97535 SELF CARE MNGMENT TRAINING: CPT

## 2018-06-14 PROCEDURE — 20600001 HC STEP DOWN PRIVATE ROOM

## 2018-06-14 PROCEDURE — 99024 POSTOP FOLLOW-UP VISIT: CPT | Mod: POP,,, | Performed by: PHYSICIAN ASSISTANT

## 2018-06-14 PROCEDURE — 80048 BASIC METABOLIC PNL TOTAL CA: CPT

## 2018-06-14 PROCEDURE — 25000003 PHARM REV CODE 250: Performed by: NURSE PRACTITIONER

## 2018-06-14 PROCEDURE — 25000003 PHARM REV CODE 250: Performed by: PHYSICIAN ASSISTANT

## 2018-06-14 PROCEDURE — 25000003 PHARM REV CODE 250: Performed by: STUDENT IN AN ORGANIZED HEALTH CARE EDUCATION/TRAINING PROGRAM

## 2018-06-14 PROCEDURE — 85025 COMPLETE CBC W/AUTO DIFF WBC: CPT

## 2018-06-14 PROCEDURE — 97530 THERAPEUTIC ACTIVITIES: CPT

## 2018-06-14 PROCEDURE — 25000003 PHARM REV CODE 250: Performed by: NEUROLOGICAL SURGERY

## 2018-06-14 PROCEDURE — 97116 GAIT TRAINING THERAPY: CPT

## 2018-06-14 RX ADMIN — BACITRACIN: 500 OINTMENT TOPICAL at 09:06

## 2018-06-14 RX ADMIN — HEPARIN SODIUM 5000 UNITS: 5000 INJECTION, SOLUTION INTRAVENOUS; SUBCUTANEOUS at 06:06

## 2018-06-14 RX ADMIN — HEPARIN SODIUM 5000 UNITS: 5000 INJECTION, SOLUTION INTRAVENOUS; SUBCUTANEOUS at 02:06

## 2018-06-14 RX ADMIN — AMLODIPINE BESYLATE 10 MG: 10 TABLET ORAL at 09:06

## 2018-06-14 RX ADMIN — PANTOPRAZOLE SODIUM 40 MG: 40 TABLET, DELAYED RELEASE ORAL at 09:06

## 2018-06-14 RX ADMIN — SODIUM CHLORIDE TAB 1 GM 1 G: 1 TAB at 09:06

## 2018-06-14 RX ADMIN — SODIUM CHLORIDE TAB 1 GM 1 G: 1 TAB at 03:06

## 2018-06-14 RX ADMIN — HEPARIN SODIUM 5000 UNITS: 5000 INJECTION, SOLUTION INTRAVENOUS; SUBCUTANEOUS at 09:06

## 2018-06-14 RX ADMIN — LISINOPRIL 20 MG: 20 TABLET ORAL at 09:06

## 2018-06-14 RX ADMIN — BACITRACIN: 500 OINTMENT TOPICAL at 10:06

## 2018-06-14 RX ADMIN — DULOXETINE 30 MG: 30 CAPSULE, DELAYED RELEASE ORAL at 09:06

## 2018-06-14 RX ADMIN — HYDROCORTISONE 10 MG: 10 TABLET ORAL at 09:06

## 2018-06-14 RX ADMIN — HYDROCORTISONE 20 MG: 10 TABLET ORAL at 06:06

## 2018-06-14 NOTE — PLAN OF CARE
Covering sw informed that pt would like HH with [facility] when ready for d/c.    Lizeth Stock, [facility] [phone]

## 2018-06-14 NOTE — PT/OT/SLP PROGRESS
"Physical Therapy Treatment    Patient Name:  Florence Everett   MRN:  [id number]    Recommendations:     Discharge Recommendations:  rehabilitation facility   Discharge Equipment Recommendations: walker, rolling   Barriers to discharge: Decreased caregiver support    Assessment:     Florence Everett is a 83 y.o. female admitted with a medical diagnosis of Pituitary mass.  She presents with the following impairments/functional limitations:  weakness, impaired endurance, gait instability, impaired self care skills, impaired functional mobilty, impaired balance, decreased safety awareness. Pt progressing well with therapy; continues to display impaired dynamic standing balance and vision impairments impacting gait and participation in ADLs. Pt displays improvement in gait stability with use of RW- would benefit from continued gait training to increase independence. Pt would benefit from continued PT intervention to address listed deficits and maximize return to PLOF.     Rehab Prognosis:  good; patient would benefit from acute skilled PT services to address these deficits and reach maximum level of function.      Recent Surgery: Procedure(s) (LRB):  CRANIOTOMY, USING FRAMELESS STEREOTAXY (STEALTH) (Left) 8 Days Post-Op    Plan:     During this hospitalization, patient to be seen 4 x/week to address the above listed problems via gait training, therapeutic activities, therapeutic exercises, neuromuscular re-education  · Plan of Care Expires:  07/09/18   Plan of Care Reviewed with: daughter, patient    Subjective     Communicated with RN prior to session.  Patient found supine with daughter at bedside upon PT entry to room. Pt alert and agreeable to treatment.      Chief Complaint: impaired vision   Patient comments/goals: "I think my vision is getting better"   Pain/Comfort:  · Pain Rating 1: 0/10  · Pain Rating Post-Intervention 1: 0/10    Patients cultural, spiritual, Adventist conflicts given the current situation: no " conflicts    Objective:     Patient found with: peripheral IV, telemetry, oxygen     General Precautions: Standard, fall   Orthopedic Precautions:N/A   Braces: N/A     Functional Mobility:       Bed Mobility    · Supine to sit: minimum assistance with HOB flat        Transfers · Sit <> Stand: SBA from EOB with RW x 1 trial; SBA from bedside chair x 2 trials with RW            Gait  · Distance: 55 ft.   · Assistance level: RW and CGA-min A for balance, safety, cueing to address gait deficits and RW management    · Gait Deviations: decreased step length, step-to gait pattern, decreased stride length, decreased toe-to-floor clearance, pathway deviation to R           Balance: impaired   - Static Standing: able to maintain static standing with eyes closed x 30 sec with SBA   - Dynamic Standing: required CGA-min A during dynamic gait activities     Therapeutic Activities and Exercises:  Therapeutic activities aimed to increase pt's independence, safety, and efficiency with bed mobility and functional transfers. See above for assistance levels. Pt required cueing for hand placement and sequencing of motor tasks. Educated on RW management.     Therapist facilitated progression of gait training to improve gait stability, endurance, and independence with functional ambulation. See above for details. Verbal/tactile cueing and demonstration provided to address gait deficits. Assistance required for navigating obstacles within hallway and managing RW.     Therex for BLE strengthening:   - LAQ x 15 reps   - marches x 15 reps   - isometric hip abduction x 10 reps     Patient left up in chair with all lines intact, call button in reach, RN notified and daughter present.    AM-PAC 6 CLICK MOBILITY  Turning over in bed (including adjusting bedclothes, sheets and blankets)?: 3  Sitting down on and standing up from a chair with arms (e.g., wheelchair, bedside commode, etc.): 3  Moving from lying on back to sitting on the side of the  bed?: 3  Moving to and from a bed to a chair (including a wheelchair)?: 3  Need to walk in hospital room?: 3  Climbing 3-5 steps with a railing?: 2  Total Score: 17       GOALS:    Physical Therapy Goals        Problem: Physical Therapy Goal    Goal Priority Disciplines Outcome Goal Variances Interventions   Physical Therapy Goal     PT/OT, PT Ongoing (interventions implemented as appropriate)     Description:  Goals to be met by: 18    Patient will increase functional independence with mobility by performin. Supine <> sit with [place]  2. Sit to stand transfer with Modified [place] with or without appropriate AD.   3. Gait  x 150 feet with Supervision with or without least restrictive AD.   4. Stand for 3 minutes with Supervision while performing dynamic balance activities to reduce fall risk   5. Lower extremity exercise program x20 reps per handout, with supervision                       Time Tracking:     PT Received On: 18  PT Start Time: 957     PT Stop Time: 1030  PT Total Time (min): 33 min     Billable Minutes: Gait Training 15 min and Therapeutic Exercise 15 min    Treatment Type: Treatment  PT/PTA: PT     PTA Visit Number: 0     Chiquita Valencia PT, DPT   2018  Pager: [phone]

## 2018-06-14 NOTE — PT/OT/SLP PROGRESS
Occupational Therapy   Treatment    Name: Florence Everett  MRN: [id number]  Admitting Diagnosis:  Pituitary mass  8 Days Post-Op    Recommendations:     Discharge Recommendations: rehabilitation facility  Discharge Equipment Recommendations:  walker, rolling  Barriers to discharge:  Decreased caregiver support    Subjective     Communicated with: RN prior to session.  Pain/Comfort:  · Pain Rating 1: 0/10  · Pain Rating Post-Intervention 1: 0/10    Patients cultural, spiritual, Denominational conflicts given the current situation:      Objective:     Patient found with: oxygen    General Precautions: Standard, fall   Orthopedic Precautions:N/A   Braces: N/A     Occupational Performance:    Bed Mobility:    · Patient completed Rolling/Turning to Right with stand by assistance  · Patient completed Scooting/Bridging with stand by assistance  · Patient completed Supine to Sit with stand by assistance  · Patient completed Sit to Supine with stand by assistance     Functional Mobility/Transfers:  · Patient completed Sit <> Stand Transfer with stand by assistance  with  rolling walker   · Functional Mobility: Pt ambulated ~25 ft at min a for RW management.      Activities of Daily Living:  · Grooming: minimum assistance oral, facial, and hand hygiene. Pt also combed her hair  · LB Dressing: stand by assistance donned/doffed socks seated EOB    Patient left HOB elevated with all lines intact, call button in reach and daughter present    [facility] 6 Click:  AMPA Total Score: 19    Treatment & Education:  Pt and pt's daughter were educated on the following:  · Safety w/ oob activities  · HH vs OP  · POC  Education:    Assessment:     Florence Everett is a 83 y.o. female with a medical diagnosis of Pituitary mass.  She presents with impairments listed below. Pt did well to displayed increased endurance w/ ambulation. Pt did well to complete ADLs. Pt displayed visual issues when attempting to apply toothpaste to toothbrush and when  managing RW around objects. Pt progressing towards goals. Pt would benefit from skilled OT services to improve independence and overall occupational functioning.      Performance deficits affecting function are weakness, impaired endurance, impaired self care skills, impaired functional mobilty, gait instability, impaired balance, decreased upper extremity function, decreased coordination, impaired coordination, impaired cardiopulmonary response to activity.      Rehab Prognosis:  good; patient would benefit from acute skilled OT services to address these deficits and reach maximum level of function.       Plan:     Patient to be seen 4 x/week to address the above listed problems via self-care/home management, therapeutic activities, therapeutic exercises, neuromuscular re-education  · Plan of Care Expires: 07/09/18  · Plan of Care Reviewed with: daughter, patient    This Plan of care has been discussed with the patient who was involved in its development and understands and is in agreement with the identified goals and treatment plan    GOALS:    Occupational Therapy Goals        Problem: Occupational Therapy Goal    Goal Priority Disciplines Outcome Interventions   Occupational Therapy Goal     OT, PT/OT     Description:  Goals to be met by: 6/16/2018     Patient will increase functional independence with ADLs by performing:    UE Dressing with [place].  LE Dressing with [place].  Grooming while seated at sink with Supervision.  Toileting from toilet with Minimal Assistance for hygiene and clothing management.   Toilet transfer to toilet with Stand-by Assistance.                      Time Tracking:     OT Date of Treatment: 06/14/18  OT Start Time: 1431  OT Stop Time: 1454  OT Total Time (min): 23 min    Billable Minutes:Self Care/Home Management 15 minutes  Therapeutic Activity 8 minutes    Pio Yo, OT  6/14/2018

## 2018-06-14 NOTE — PLAN OF CARE
Problem: Physical Therapy Goal  Goal: Physical Therapy Goal  Goals to be met by: 18    Patient will increase functional independence with mobility by performin. Supine <> sit with [place]  2. Sit to stand transfer with Modified [place] with or without appropriate AD.   3. Gait  x 150 feet with Supervision with or without least restrictive AD.   4. Stand for 3 minutes with Supervision while performing dynamic balance activities to reduce fall risk   5. Lower extremity exercise program x20 reps per handout, with supervision     Outcome: Ongoing (interventions implemented as appropriate)  Pt progressing towards goals; continue current POC.     Chiquita Valencia PT, DPT   2018  Pager: [phone]

## 2018-06-14 NOTE — PLAN OF CARE
Problem: Occupational Therapy Goal  Goal: Occupational Therapy Goal  Goals to be met by: 6/16/2018     Patient will increase functional independence with ADLs by performing:    UE Dressing with [place].  LE Dressing with [place].  Grooming while seated at sink with Supervision.  Toileting from toilet with Minimal Assistance for hygiene and clothing management.   Toilet transfer to toilet with Stand-by Assistance.     Continue OT POC     Comments: Pio Yo OTR/L  6/14/2018

## 2018-06-14 NOTE — PLAN OF CARE
Planned discharge is home with family and home health - Plan (A) or Rehab - Plan (B).     06/14/18 1253   Discharge Reassessment   Assessment Type Discharge Planning Reassessment   Provided patient/caregiver education on the expected discharge date and the discharge plan No   Do you have any problems affording any of your prescribed medications? No   Discharge Plan A Home with family;Home Health   Discharge Plan B Rehab   Patient choice form signed by patient/caregiver N/A   Can the patient answer the patient profile reliably? Yes, cognitively intact   How does the patient rate their overall health at the present time? Good   Describe the patient's ability to walk at the present time. Walks with the help of equipment   How often would a person be available to care for the patient? Whenever needed   Number of comorbid conditions (as recorded on the chart) Two   During the past month, has the patient often been bothered by feeling down, depressed or hopeless? No   During the past month, has the patient often been bothered by little interest or pleasure in doing things? No

## 2018-06-14 NOTE — NURSING TRANSFER
Nursing Transfer Note      6/13/2018     Transfer To: Rm [id number]     Transfer via bed    Transfer with cardiac monitoring    Transported by 1 RN and 1 PCT     Medicines sent: bacitracin     Chart send with patient: Yes    Notified: daughter    Patient reassessed at: 6/13/18 @ 4575 with oncoming nurse Bina    Upon arrival to floor: patient oriented to room, call bell in reach and bed in lowest position

## 2018-06-14 NOTE — PROGRESS NOTES
[facility]  Neurosurgery  Progress Note    Subjective:     History of Present Illness: Florence Everett is a 83 y.o. female w/ a significant PMHx of HTN, GERD, and pituitary tumor found after patient suffered from a fall and underwent a CT scan. MRI was then done which showed large pituitary tumor w/ extension into suprasellar region. On exam there is left optic pallor. She shows bitemporal hemianopsia and vision is decreased to barely finger counting in the left eye.     MRI of the brain was done at [facility] in [place] on 04/05/18. There is a large lobulated pituitary tumor, which extends on to the planum sphenoidale and up to the third ventricle. The right optic nerve is displaced laterally. The left optic nerve is not well seen. The tumor was measured as greater than 3 cm.     Patient now presents for surgery. No new complaints.    Post-Op Info:  Procedure(s) (LRB):  CRANIOTOMY, USING FRAMELESS STEREOTAXY (STEALTH) (Left)   8 Days Post-Op     Interval History: Pt is resting comfortably in chair. She denies headache, n/v, weakness, or paresthesias. Vision unchanged. Tolerating diet and reports BM yesterday. Tejada in place.     Medications:  Continuous Infusions:  Scheduled Meds:   amLODIPine  10 mg Oral QHS    bacitracin   Topical (Top) BID    DULoxetine  30 mg Oral Daily    heparin (porcine)  5,000 Units Subcutaneous Q8H    hydrocortisone  10 mg Oral QHS    hydrocortisone  20 mg Oral QAM    lisinopril  20 mg Oral Daily    pantoprazole  40 mg Oral Daily    sodium chloride  1 g Oral TID     PRN Meds:hydrALAZINE, labetalol, oxyCODONE-acetaminophen     Review of Systems  Objective:     Weight: 71.8 kg (158 lb 4.6 oz)  Body mass index is 27.17 kg/m².  Vital Signs (Most Recent):  Temp: 99.3 °F (37.4 °C) (06/14/18 1217)  Pulse: 95 (06/14/18 1217)  Resp: 18 (06/14/18 1217)  BP: 106/69 (06/14/18 1217)  SpO2: 95 % (06/14/18 1217) Vital Signs (24h Range):  Temp:  [98.4 °F  "(36.9 °C)-99.3 °F (37.4 °C)] 99.3 °F (37.4 °C)  Pulse:  [76-96] 95  Resp:  [12-18] 18  SpO2:  [89 %-97 %] 95 %  BP: (105-159)/(57-79) 106/69       Date 06/14/18 0700 - 06/15/18 0659   Shift 7526-1215 6757-5637 7372-1503 24 Hour Total   I  N  T  A  K  E   P.O. 220   220    Shift Total  (mL/kg) 220  (3.1)   220  (3.1)   O  U  T  P  U  T   Urine  (mL/kg/hr) 175   175    Shift Total  (mL/kg) 175  (2.4)   175  (2.4)   Weight (kg) 71.8 71.8 71.8 71.8                        Urethral Catheter 06/12/18 1031 (Active)   Site Assessment Intact;Clean;Dry 6/13/2018  7:15 PM   Collection Container Urimeter 6/13/2018  7:15 PM   Securement Method secured to top of thigh w/ adhesive device 6/13/2018  7:15 PM   Catheter Care Performed yes 6/13/2018  7:15 PM   Reason for Continuing Urinary Catheterization Critically ill in ICU requiring intensive monitoring 6/13/2018  7:15 PM   CAUTI Prevention Bundle StatLock in place w 1" slack;Intact seal between catheter & drainage tubing;Drainage bag off the floor;Green sheeting clip in use;No dependent loops or kinks;Drainage bag not overfilled (<2/3 full);Drainage bag below bladder 6/13/2018  3:01 PM   Output (mL) 950 mL 6/14/2018  6:15 AM       Neurosurgery Physical Exam  General: well developed, well nourished, no distress.   Head: normocephalic, atraumatic  Neurologic: Alert and oriented. Thought content appropriate.  GCS: Motor: 6/Verbal: 5/Eyes: 4 GCS Total: 15  Mental Status: Awake, Alert, Oriented x 4  Language: No aphasia  Speech: No dysarthria  Cranial nerves: face symmetric, tongue midline, CN II-XII grossly intact.   Eyes: pupils equal, round, reactive to light with accomodation, EOMI. Left ptosis. Unable to count fingers with left eye only.   Pulmonary: normal respirations, no signs of respiratory distress  Abdomen: soft, non-distended, not tender to palpation  Sensory: intact to light touch throughout    Motor Strength:Moves all extremities spontaneously with good tone.  Full " strength upper and lower extremities. No abnormal movements seen.     Strength  Deltoids Triceps Biceps Wrist Extension Wrist Flexion Hand    Upper: R 5/5 5/5 5/5 5/5 5/5 5/5    L 5/5 5/5 5/5 5/5 5/5 5/5     Iliopsoas Quadriceps Knee  Flexion Tibialis  anterior Gastro- cnemius EHL   Lower: R 5/5 5/5 5/5 5/5 5/5 5/5    L 5/5 5/5 5/5 5/5 5/5 5/5     DTR's - 2 + and symmetric in UE and LE  Pronator Drift: present on right  Finger-to-nose: Intact bilaterally  Daigle: absent  Clonus: absent  Pulses: 2+ and symmetric radial and dorsalis pedis.  Skin: Skin is warm, dry and intact.  Incision c/d/i with no surrounding erythema, edema, or drainage. Skin edges well approximated with staples.    Significant Labs:    Recent Labs  Lab 06/13/18 0204 06/13/18 1746 06/13/18 2106 06/14/18  0550   *  --   --   --  105     137  < > 136 137 136   K 3.2*  --   --   --  3.1*   CL 97  --   --   --  97   CO2 28  --   --   --  30*   BUN 12  --   --   --  11   CREATININE 0.7  --   --   --  0.7   CALCIUM 8.9  --   --   --  9.1   MG 2.2  --   --   --   --    < > = values in this interval not displayed.    Recent Labs  Lab 06/13/18 0204 06/14/18  0550   WBC 11.46 10.49   HGB 11.4* 12.4   HCT 34.5* 37.0    290     No results for input(s): LABPT, INR, APTT in the last 48 hours.  Microbiology Results (last 7 days)     ** No results found for the last 168 hours. **        Recent Lab Results       06/14/18  0550 06/13/18 2106 06/13/18 1746      Immature Granulocytes 1.7(H)       Immature Grans (Abs) 0.18  Comment:  Mild elevation in immature granulocytes is non specific and   can be seen in a variety of conditions including stress response,   acute inflammation, trauma and pregnancy. Correlation with other   laboratory and clinical findings is essential.  (H)       Anion Gap 9       Baso # 0.02       Basophil% 0.2       BUN, Bld 11       Calcium 9.1       Chloride 97       CO2 30(H)       Cortisol   18.7  Comment:  Cortisol Reference Range:  Before 10:00 am: 4.46-22.7 ug/dL  After 5:00 pm:    1.7-14.1 ug/dL        Creatinine 0.7       Differential Method Automated       eGFR if  >60.0       eGFR if non  >60.0  Comment:  Calculation used to obtain the estimated glomerular filtration  rate (eGFR) is the CKD-EPI equation.          Eos # 0.0       Eosinophil% 0.4       FSH  2.10  Comment:  Female Reference Ranges:  Follicular Phase.................3.03-8.08 mIU/mL  Midcycle Peak....................2.55-16.69 mIU/mL  Luteal Phase.....................1.38-5.47 mIU/mL  Postmenopausal...................26..41 mIU/mL  Male Reference Range:............0.95-11.95 mIU/mL        Glucose 105       Gran # (ANC) 7.0       Gran% 66.6       Hematocrit 37.0       Hemoglobin 12.4       Lymph # 1.9       Lymph% 17.8(L)       MCH 30.4       MCHC 33.5       MCV 91       Mono # 1.4(H)       Mono% 13.3       MPV 11.1       nRBC 0       Platelets 290       Potassium 3.1(L)       Prolactin  12.4      RBC 4.08       RDW 13.8       Sodium 136 137 136     TSH  0.555      WBC 10.49           All pertinent labs from the last 24 hours have been reviewed.    Significant Diagnostics:  No new imaging    Assessment/Plan:     * Pituitary mass    83F with likely pituitary adenoma now s/p crani for resection on 6/5    -- Neuro stable  -- Hydrocortisone 20mg am,  10mg pm   -- Pituitary endocrine labs: FSH 2.1, Prolactin 12.4, TSH 0.55, Cortisol 18.7  --EEG negative for seizures  -- Pain controlled on current regimen. Pt denies headaches.   -- HTN: continue home lisinopril and amlodipine. Use prn meds for BP >160  -- Zofran prn for nausea  -- Regular diet.  -- Patient OOB >4h/day.  -- PPx: PPI, SQH, IS, SCDs, bowel regimen  -- Bacitracin to incision bid  -- Marie in place, pt requesting to go home. Will remove marie and begin voiding trial. If pt is able to void will dc home.   -- PT/OT recommending rehab. Pt  wishes to go home with HH . Pt's  is already established with a HH company, pt wishes to use the same one.                 Nury Martinez PA-C  Neurosurgery  [facility]

## 2018-06-14 NOTE — ASSESSMENT & PLAN NOTE
83F with likely pituitary adenoma now s/p crani for resection on 6/5    -- Neuro stable  -- Hydrocortisone 20mg am,  10mg pm   -- Pituitary endocrine labs: FSH 2.1, Prolactin 12.4, TSH 0.55, Cortisol 18.7  -- Pain controlled on current regimen. Pt denies headaches.   -- HTN: continue home lisinopril and amlodipine. Use prn meds for BP >160  -- Zofran prn for nausea  -- Regular diet.  -- Patient OOB >4h/day.  -- PPx: PPI, SQH, IS, SCDs, bowel regimen  -- Bacitracin to incision bid  -- Marie in place, pt requesting to go home. Will remove marie and begin voiding trial. If pt is able to void will dc home.   -- PT/OT recommending rehab. Pt wishes to go home with  . Pt's  is already established with a [email or web address] company, pt wishes to use the same one.

## 2018-06-14 NOTE — SUBJECTIVE & OBJECTIVE
"Interval History: Pt is resting comfortably in chair. She denies headache, n/v, weakness, or paresthesias. Vision unchanged. Tolerating diet and reports BM yesterday. Tejada in place.     Medications:  Continuous Infusions:  Scheduled Meds:   amLODIPine  10 mg Oral QHS    bacitracin   Topical (Top) BID    DULoxetine  30 mg Oral Daily    heparin (porcine)  5,000 Units Subcutaneous Q8H    hydrocortisone  10 mg Oral QHS    hydrocortisone  20 mg Oral QAM    lisinopril  20 mg Oral Daily    pantoprazole  40 mg Oral Daily    sodium chloride  1 g Oral TID     PRN Meds:hydrALAZINE, labetalol, oxyCODONE-acetaminophen     Review of Systems  Objective:     Weight: 71.8 kg (158 lb 4.6 oz)  Body mass index is 27.17 kg/m².  Vital Signs (Most Recent):  Temp: 99.3 °F (37.4 °C) (06/14/18 1217)  Pulse: 95 (06/14/18 1217)  Resp: 18 (06/14/18 1217)  BP: 106/69 (06/14/18 1217)  SpO2: 95 % (06/14/18 1217) Vital Signs (24h Range):  Temp:  [98.4 °F (36.9 °C)-99.3 °F (37.4 °C)] 99.3 °F (37.4 °C)  Pulse:  [76-96] 95  Resp:  [12-18] 18  SpO2:  [89 %-97 %] 95 %  BP: (105-159)/(57-79) 106/69       Date 06/14/18 0700 - 06/15/18 0659   Shift 8429-3387 0556-6940 0732-4290 24 Hour Total   I  N  T  A  K  E   P.O. 220   220    Shift Total  (mL/kg) 220  (3.1)   220  (3.1)   O  U  T  P  U  T   Urine  (mL/kg/hr) 175   175    Shift Total  (mL/kg) 175  (2.4)   175  (2.4)   Weight (kg) 71.8 71.8 71.8 71.8                        Urethral Catheter 06/12/18 1031 (Active)   Site Assessment Intact;Clean;Dry 6/13/2018  7:15 PM   Collection Container Urimeter 6/13/2018  7:15 PM   Securement Method secured to top of thigh w/ adhesive device 6/13/2018  7:15 PM   Catheter Care Performed yes 6/13/2018  7:15 PM   Reason for Continuing Urinary Catheterization Critically ill in ICU requiring intensive monitoring 6/13/2018  7:15 PM   CAUTI Prevention Bundle StatLock in place w 1" slack;Intact seal between catheter & drainage tubing;Drainage bag off the floor;Green " sheeting clip in use;No dependent loops or kinks;Drainage bag not overfilled (<2/3 full);Drainage bag below bladder 6/13/2018  3:01 PM   Output (mL) 950 mL 6/14/2018  6:15 AM       Neurosurgery Physical Exam  General: well developed, well nourished, no distress.   Head: normocephalic, atraumatic  Neurologic: Alert and oriented. Thought content appropriate.  GCS: Motor: 6/Verbal: 5/Eyes: 4 GCS Total: 15  Mental Status: Awake, Alert, Oriented x 4  Language: No aphasia  Speech: No dysarthria  Cranial nerves: face symmetric, tongue midline, CN II-XII grossly intact.   Eyes: pupils equal, round, reactive to light with accomodation, EOMI. Left ptosis. Unable to count fingers with left eye only.   Pulmonary: normal respirations, no signs of respiratory distress  Abdomen: soft, non-distended, not tender to palpation  Sensory: intact to light touch throughout    Motor Strength:Moves all extremities spontaneously with good tone.  Full strength upper and lower extremities. No abnormal movements seen.     Strength  Deltoids Triceps Biceps Wrist Extension Wrist Flexion Hand    Upper: R 5/5 5/5 5/5 5/5 5/5 5/5    L 5/5 5/5 5/5 5/5 5/5 5/5     Iliopsoas Quadriceps Knee  Flexion Tibialis  anterior Gastro- cnemius EHL   Lower: R 5/5 5/5 5/5 5/5 5/5 5/5    L 5/5 5/5 5/5 5/5 5/5 5/5     DTR's - 2 + and symmetric in UE and LE  Pronator Drift: present on right  Finger-to-nose: Intact bilaterally  Daigle: absent  Clonus: absent  Pulses: 2+ and symmetric radial and dorsalis pedis.  Skin: Skin is warm, dry and intact.  Incision c/d/i with no surrounding erythema, edema, or drainage. Skin edges well approximated with staples.    Significant Labs:    Recent Labs  Lab 06/13/18  0204  06/13/18  1746 06/13/18  2106 06/14/18  0550   *  --   --   --  105     137  < > 136 137 136   K 3.2*  --   --   --  3.1*   CL 97  --   --   --  97   CO2 28  --   --   --  30*   BUN 12  --   --   --  11   CREATININE 0.7  --   --   --  0.7    CALCIUM 8.9  --   --   --  9.1   MG 2.2  --   --   --   --    < > = values in this interval not displayed.    Recent Labs  Lab 06/13/18  0204 06/14/18  0550   WBC 11.46 10.49   HGB 11.4* 12.4   HCT 34.5* 37.0    290     No results for input(s): LABPT, INR, APTT in the last 48 hours.  Microbiology Results (last 7 days)     ** No results found for the last 168 hours. **        Recent Lab Results       06/14/18  0550 06/13/18  2106 06/13/18  1746      Immature Granulocytes 1.7(H)       Immature Grans (Abs) 0.18  Comment:  Mild elevation in immature granulocytes is non specific and   can be seen in a variety of conditions including stress response,   acute inflammation, trauma and pregnancy. Correlation with other   laboratory and clinical findings is essential.  (H)       Anion Gap 9       Baso # 0.02       Basophil% 0.2       BUN, Bld 11       Calcium 9.1       Chloride 97       CO2 30(H)       Cortisol  18.7  Comment:  Cortisol Reference Range:  Before 10:00 am: 4.46-22.7 ug/dL  After 5:00 pm:    1.7-14.1 ug/dL        Creatinine 0.7       Differential Method Automated       eGFR if  >60.0       eGFR if non  >60.0  Comment:  Calculation used to obtain the estimated glomerular filtration  rate (eGFR) is the CKD-EPI equation.          Eos # 0.0       Eosinophil% 0.4       FSH  2.10  Comment:  Female Reference Ranges:  Follicular Phase.................3.03-8.08 mIU/mL  Midcycle Peak....................2.55-16.69 mIU/mL  Luteal Phase.....................1.38-5.47 mIU/mL  Postmenopausal...................26..41 mIU/mL  Male Reference Range:............0.95-11.95 mIU/mL        Glucose 105       Gran # (ANC) 7.0       Gran% 66.6       Hematocrit 37.0       Hemoglobin 12.4       Lymph # 1.9       Lymph% 17.8(L)       MCH 30.4       MCHC 33.5       MCV 91       Mono # 1.4(H)       Mono% 13.3       MPV 11.1       nRBC 0       Platelets 290       Potassium 3.1(L)       Prolactin   12.4      RBC 4.08       RDW 13.8       Sodium 136 137 136     TSH  0.555      WBC 10.49           All pertinent labs from the last 24 hours have been reviewed.    Significant Diagnostics:  No new imaging

## 2018-06-15 VITALS
SYSTOLIC BLOOD PRESSURE: 151 MMHG | HEIGHT: 64 IN | WEIGHT: 158.31 LBS | TEMPERATURE: 99 F | HEART RATE: 84 BPM | BODY MASS INDEX: 27.03 KG/M2 | RESPIRATION RATE: 18 BRPM | OXYGEN SATURATION: 92 % | DIASTOLIC BLOOD PRESSURE: 82 MMHG

## 2018-06-15 LAB
ANION GAP SERPL CALC-SCNC: 13 MMOL/L
BASOPHILS # BLD AUTO: 0.08 K/UL
BASOPHILS NFR BLD: 0.7 %
BUN SERPL-MCNC: 16 MG/DL
CALCIUM SERPL-MCNC: 8.8 MG/DL
CHLORIDE SERPL-SCNC: 96 MMOL/L
CO2 SERPL-SCNC: 24 MMOL/L
CREAT SERPL-MCNC: 0.7 MG/DL
DIFFERENTIAL METHOD: ABNORMAL
EOSINOPHIL # BLD AUTO: 0 K/UL
EOSINOPHIL NFR BLD: 0.3 %
ERYTHROCYTE [DISTWIDTH] IN BLOOD BY AUTOMATED COUNT: 13.9 %
EST. GFR  (AFRICAN AMERICAN): >60 ML/MIN/1.73 M^2
EST. GFR  (NON AFRICAN AMERICAN): >60 ML/MIN/1.73 M^2
GLUCOSE SERPL-MCNC: 78 MG/DL
HCT VFR BLD AUTO: 38.9 %
HGB BLD-MCNC: 12.5 G/DL
IMM GRANULOCYTES # BLD AUTO: 0.21 K/UL
IMM GRANULOCYTES NFR BLD AUTO: 1.8 %
LYMPHOCYTES # BLD AUTO: 2.6 K/UL
LYMPHOCYTES NFR BLD: 22.5 %
MCH RBC QN AUTO: 30.3 PG
MCHC RBC AUTO-ENTMCNC: 32.1 G/DL
MCV RBC AUTO: 94 FL
MONOCYTES # BLD AUTO: 1.5 K/UL
MONOCYTES NFR BLD: 12.9 %
NEUTROPHILS # BLD AUTO: 7.2 K/UL
NEUTROPHILS NFR BLD: 61.8 %
NRBC BLD-RTO: 0 /100 WBC
PLATELET # BLD AUTO: 236 K/UL
PMV BLD AUTO: 10.6 FL
POTASSIUM SERPL-SCNC: 4.2 MMOL/L
RBC # BLD AUTO: 4.12 M/UL
SODIUM SERPL-SCNC: 133 MMOL/L
WBC # BLD AUTO: 11.71 K/UL

## 2018-06-15 PROCEDURE — 99024 POSTOP FOLLOW-UP VISIT: CPT | Mod: POP,,, | Performed by: PHYSICIAN ASSISTANT

## 2018-06-15 PROCEDURE — 36415 COLL VENOUS BLD VENIPUNCTURE: CPT

## 2018-06-15 PROCEDURE — 25000003 PHARM REV CODE 250: Performed by: NURSE PRACTITIONER

## 2018-06-15 PROCEDURE — 25000003 PHARM REV CODE 250: Performed by: STUDENT IN AN ORGANIZED HEALTH CARE EDUCATION/TRAINING PROGRAM

## 2018-06-15 PROCEDURE — 85025 COMPLETE CBC W/AUTO DIFF WBC: CPT

## 2018-06-15 PROCEDURE — 97530 THERAPEUTIC ACTIVITIES: CPT

## 2018-06-15 PROCEDURE — 80048 BASIC METABOLIC PNL TOTAL CA: CPT

## 2018-06-15 PROCEDURE — 97116 GAIT TRAINING THERAPY: CPT

## 2018-06-15 PROCEDURE — 63600175 PHARM REV CODE 636 W HCPCS: Performed by: NURSE PRACTITIONER

## 2018-06-15 RX ORDER — CYANOCOBALAMIN (VITAMIN B-12) 2500 MCG
5000 TABLET, SUBLINGUAL SUBLINGUAL DAILY
Start: 2018-06-15

## 2018-06-15 RX ORDER — HYDROCORTISONE 10 MG/1
TABLET ORAL
Qty: 90 TABLET | Refills: 1 | Status: SHIPPED | OUTPATIENT
Start: 2018-06-15 | End: 2018-09-10

## 2018-06-15 RX ORDER — AMLODIPINE BESYLATE 10 MG/1
10 TABLET ORAL NIGHTLY
Qty: 30 TABLET | Refills: 1 | Status: SHIPPED | OUTPATIENT
Start: 2018-06-15 | End: 2018-09-10

## 2018-06-15 RX ORDER — OXYCODONE AND ACETAMINOPHEN 5; 325 MG/1; MG/1
1 TABLET ORAL
Qty: 75 TABLET | Refills: 0 | Status: SHIPPED | OUTPATIENT
Start: 2018-06-15

## 2018-06-15 RX ADMIN — HEPARIN SODIUM 5000 UNITS: 5000 INJECTION, SOLUTION INTRAVENOUS; SUBCUTANEOUS at 02:06

## 2018-06-15 RX ADMIN — SODIUM CHLORIDE TAB 1 GM 1 G: 1 TAB at 09:06

## 2018-06-15 RX ADMIN — LISINOPRIL 20 MG: 20 TABLET ORAL at 09:06

## 2018-06-15 RX ADMIN — HEPARIN SODIUM 5000 UNITS: 5000 INJECTION, SOLUTION INTRAVENOUS; SUBCUTANEOUS at 05:06

## 2018-06-15 RX ADMIN — PANTOPRAZOLE SODIUM 40 MG: 40 TABLET, DELAYED RELEASE ORAL at 09:06

## 2018-06-15 RX ADMIN — HYDROCORTISONE 20 MG: 10 TABLET ORAL at 05:06

## 2018-06-15 RX ADMIN — SODIUM CHLORIDE TAB 1 GM 1 G: 1 TAB at 02:06

## 2018-06-15 RX ADMIN — DULOXETINE 30 MG: 30 CAPSULE, DELAYED RELEASE ORAL at 09:06

## 2018-06-15 NOTE — PLAN OF CARE
Problem: Physical Therapy Goal  Goal: Physical Therapy Goal  Goals to be met by: 18    Patient will increase functional independence with mobility by performin. Supine <> sit with [place]  2. Sit to stand transfer with Modified [place] with or without appropriate AD.   3. Gait  x 150 feet with Supervision with or without least restrictive AD.   4. Stand for 3 minutes with Supervision while performing dynamic balance activities to reduce fall risk   5. Lower extremity exercise program x20 reps per handout, with supervision      Outcome: Ongoing (interventions implemented as appropriate)  Pt progressing towards goals; continue current POC.     Chiquita Valencia PT, DPT   6/15/2018  Pager: [phone]

## 2018-06-15 NOTE — PROGRESS NOTES
[facility]  Neurosurgery  Progress Note    Subjective:     History of Present Illness: Florence Everett is a 83 y.o. female w/ a significant PMHx of HTN, GERD, and pituitary tumor found after patient suffered from a fall and underwent a CT scan. MRI was then done which showed large pituitary tumor w/ extension into suprasellar region. On exam there is left optic pallor. She shows bitemporal hemianopsia and vision is decreased to barely finger counting in the left eye.     MRI of the brain was done at [facility] in [place] on 04/05/18. There is a large lobulated pituitary tumor, which extends on to the planum sphenoidale and up to the third ventricle. The right optic nerve is displaced laterally. The left optic nerve is not well seen. The tumor was measured as greater than 3 cm.     Patient now presents for surgery. No new complaints.    Post-Op Info:  Procedure(s) (LRB):  CRANIOTOMY, USING FRAMELESS STEREOTAXY (STEALTH) (Left)   9 Days Post-Op     Interval History:   NAEON. Exam attempted earlier but patient somnolent. Patient now fully awake and participating in exam. Denies any headaches, dizziness, N/V, worsening vision, or weakness. Tolerating diet. Voiding appropriately.     Medications:  Continuous Infusions:  Scheduled Meds:   amLODIPine  10 mg Oral QHS    bacitracin   Topical (Top) BID    DULoxetine  30 mg Oral Daily    heparin (porcine)  5,000 Units Subcutaneous Q8H    hydrocortisone  10 mg Oral QHS    hydrocortisone  20 mg Oral QAM    lisinopril  20 mg Oral Daily    pantoprazole  40 mg Oral Daily    sodium chloride  1 g Oral TID     PRN Meds:hydrALAZINE, labetalol, oxyCODONE-acetaminophen     Review of Systems  Objective:     Weight: 71.8 kg (158 lb 4.6 oz)  Body mass index is 27.17 kg/m².  Vital Signs (Most Recent):  Temp: 98.6 °F (37 °C) (06/15/18 1142)  Pulse: 84 (06/15/18 1142)  Resp: 18 (06/15/18 1142)  BP: (!) 151/82 (06/15/18 1142)  SpO2: (!) 92 %  "(06/15/18 1142) Vital Signs (24h Range):  Temp:  [96.7 °F (35.9 °C)-98.7 °F (37.1 °C)] 98.6 °F (37 °C)  Pulse:  [74-95] 84  Resp:  [16-18] 18  SpO2:  [92 %-97 %] 92 %  BP: (109-151)/(56-82) 151/82                           Urethral Catheter 06/12/18 1031 (Active)   Site Assessment Intact;Clean;Dry 6/13/2018  7:15 PM   Collection Container Urimeter 6/13/2018  7:15 PM   Securement Method secured to top of thigh w/ adhesive device 6/13/2018  7:15 PM   Catheter Care Performed yes 6/13/2018  7:15 PM   Reason for Continuing Urinary Catheterization Critically ill in ICU requiring intensive monitoring 6/13/2018  7:15 PM   CAUTI Prevention Bundle StatLock in place w 1" slack;Intact seal between catheter & drainage tubing;Drainage bag off the floor;Green sheeting clip in use;No dependent loops or kinks;Drainage bag not overfilled (<2/3 full);Drainage bag below bladder 6/13/2018  3:01 PM   Output (mL) 175 mL 6/14/2018  1:00 PM       Neurosurgery Physical Exam   General: well developed, well nourished, no distress.   Head: normocephalic  Neurologic: Alert and oriented. Thought content appropriate.  GCS: Motor: 6/Verbal: 5/Eyes: 4 GCS Total: 15  Mental Status: Awake, Alert, Oriented x 4  Language: No aphasia  Speech: No dysarthria  Cranial nerves: face symmetric, tongue midline, CN II-XII grossly intact.   Eyes: pupils equal, round, reactive to light with accomodation, EOMI. Decreased vision bilaterally, L>R.  Pulmonary: normal respirations, no signs of respiratory distress  Abdomen: soft, non-distended, not tender to palpation  Sensory: intact to light touch throughout  Motor Strength:Moves all extremities spontaneously with good tone.  Full strength upper and lower extremities. No abnormal movements seen.   Pronator Drift: no drift noted  Finger-to-nose: Intact bilaterally  Clonus: absent  Babinski: absent  Skin: Skin is warm, dry and intact.        Incision:  Clean, dry, staples intact. No surrounding erythema or edema. No " drainage or TTP.       Significant Labs:    Recent Labs  Lab 06/13/18  2106 06/14/18  0550 06/15/18  0342   GLU  --  105 78    136 133*   K  --  3.1* 4.2   CL  --  97 96   CO2  --  30* 24   BUN  --  11 16   CREATININE  --  0.7 0.7   CALCIUM  --  9.1 8.8       Recent Labs  Lab 06/14/18  0550 06/15/18  0811   WBC 10.49 11.71   HGB 12.4 12.5   HCT 37.0 38.9    236         Assessment/Plan:     * Pituitary mass    83F with likely pituitary adenoma now s/p crani for resection on 6/5.    -Patient neurologically stable on exam  -Continue Cortef 20 mg q AM and 10 mg q PM  -HTN: Continue adjusted regimen. FU with PCP after DC from Rehab for management.   -Hyponatremia: Na 136. Continue Na tabs 1 g TID.   -FU with Dr. Shaffer in 2 weeks for staple removal and path results  -DC home with  PT/OT/ST. Plans for in home assessment by [facility] next week for possible admission.   -Discharge instructions given verbally to patient and family. All of their questions were answered. They were encouraged to call the clinic with any questions or concerns prior to follow up appt.                 SAMMIE Castro  Neurosurgery  [facility]

## 2018-06-15 NOTE — PLAN OF CARE
Patient to be discharged home with home health. SW to set up home health with [facility]. Family to provide transportation home. Neurosurgery clinic to schedule follow up appointment.  Future Appointments  Date Time Provider Department Center   6/21/2018 12:00 PM Keo Shaffer MD [facility] NEUROS7 [facility]        06/15/18 1431   Final Note   Assessment Type Final Discharge Note   Discharge Disposition Home-Health   Hospital Follow Up  Appt(s) scheduled? (Neurosurgery clinic to schedule follow up appointment.)   Discharge plans and expectations educations in teach back method with documentation complete? Yes

## 2018-06-15 NOTE — DISCHARGE SUMMARY
[facility]  Neurosurgery  Discharge Summary      Patient Name: Florence Everett  MRN: [id number]  Admission Date: 6/6/2018  Hospital Length of Stay: 9 days  Discharge Date and Time:  06/15/2018   Attending Physician: Keo Shaffer MD   Discharging Provider: SAMMIE Castro  Primary Care Provider: Doc Moore MD    HPI:   Florence Everett is a 83 y.o. female w/ a significant PMHx of HTN, GERD, and pituitary tumor found after patient suffered from a fall and underwent a CT scan. MRI was then done which showed large pituitary tumor w/ extension into suprasellar region. On exam there is left optic pallor. She shows bitemporal hemianopsia and vision is decreased to barely finger counting in the left eye.     MRI of the brain was done at [facility] in [place] on 04/05/18. There is a large lobulated pituitary tumor, which extends on to the planum sphenoidale and up to the third ventricle. The right optic nerve is displaced laterally. The left optic nerve is not well seen. The tumor was measured as greater than 3 cm.     Patient now presents for surgery. No new complaints.      DATE OF PROCEDURE:  06/06/2018.     PREOPERATIVE DIAGNOSIS:  Pituitary adenoma.     POSTOPERATIVE DIAGNOSIS:  Pituitary adenoma.     PROCEDURES PERFORMED:  Left frontotemporal craniotomy, excision of pituitary tumor with neuronavigation and microsurgery.     SURGEON:  Keo Shaffer M.D.  ASSISTANT:  REBEKAH Crespo M.D. (RES) ([facility] Resident Neurosurgery).     DRAINS:  Hemovac subgaleal.     BRIEF HISTORY:  This 83-year-old lady has noted progressive visual loss for at least one year.  At this point, she shows a bitemporal hemianopsia with finger counting only in the left eye.  The tumor extends up into the third ventricle and has lateral lobulations.  It was felt this would be better addressed through a craniotomy.        Hospital Course:  6/6: OR today for a left frontotemporal craniotomy with  excision of pituitary tumor with neuronavigation and microsurgery. No intra op complications. Patient tolerated procedure well. Admitted to [facility] post op.   6/7: Post op CT head with blood in ventricles; Follow up scan scheduled today. SG drain remains in place.  6/8: Repeat head CT stable. Patient more awake and alert than yesterday. SG drain remains in place.  6/9: SG drain remains in place. Vision improving. No signs of developing DI.   6/10: SG drain pulled in AM. Patient tolerated removal well. Emesis this afternoon, CTH stable.  6/12: Encephalopathic this am, sodium drop to 132mmHg, no obvious development of excessive diuresis. Urine studies pending, fluid restrict, EEG x 24 hrs.  6/13: Exam improved this morning. Na improved with fluid restriction. Pending step down to NSGY service. DVT ppx started.   6/14: Pt remains stable. Requesting to go home with HH. Follow up appts made.   6/15: Exam stable. Na 136. PT/OT recommending Rehab. Patient will DC with home health and family support. Rehab home eval scheduled for Monday with plans to admit patient from home. Discharge instructions given verbally to patient and family. All of their questions were answered. They were encouraged to call the clinic with any questions or concerns prior to follow up appt.       Consults: Neuro critical care, PT, ST, OT, social work    Significant Diagnostic Studies: Labs:   BMP:   Recent Labs  Lab 06/13/18  2106 06/14/18  0550 06/15/18  0342   GLU  --  105 78    136 133*   K  --  3.1* 4.2   CL  --  97 96   CO2  --  30* 24   BUN  --  11 16   CREATININE  --  0.7 0.7   CALCIUM  --  9.1 8.8    and CBC   Recent Labs  Lab 06/14/18  0550 06/15/18  0811   WBC 10.49 11.71   HGB 12.4 12.5   HCT 37.0 38.9    236     Radiology: X-Ray: CXR: X-Ray Chest 1 View (CXR):   Results for orders placed or performed during the hospital encounter of 06/06/18   X-Ray Chest 1 View    Narrative    EXAMINATION:  XR CHEST 1 VIEW    CLINICAL  "HISTORY:  ICU patient;    TECHNIQUE:  Single frontal view of the chest was performed.    COMPARISON:  6/8    FINDINGS:  The heart size is normal.  Mediastinum shows aortic atherosclerosis.  Lungs are expanded and clear.  No lung consolidation or pleural fluid is detected.  Skeletal structures show no acute finding.      Impression    No acute cardiopulmonary disease      Electronically signed by: Mao Iraheta MD  Date:    06/09/2018  Time:    07:44     CT scan: head    Pending Diagnostic Studies:     None        Final Active Diagnoses:    Diagnosis Date Noted POA    PRINCIPAL PROBLEM:  Pituitary mass [E23.7] 06/06/2018 Yes    Encephalopathy acute [G93.40] 06/12/2018 No    Hyponatremia [E87.1] 06/12/2018 No    IVH (intraventricular hemorrhage) [I61.5] 06/11/2018 No    Brain compression [G93.5] 06/08/2018 Yes    SDH (subdural hematoma) [I62.00] 06/08/2018 No    Nontraumatic subcortical hemorrhage of right cerebral hemisphere [I61.0] 06/08/2018 No    Visual disturbance [H53.9] 06/08/2018 Yes    Essential hypertension [I10] 06/06/2018 Yes      Problems Resolved During this Admission:    Diagnosis Date Noted Date Resolved POA      Discharged Condition: good    Disposition: Home-Health Care Svc    Follow Up: 2 weeks with Dr. Shaffer    Patient Instructions:   See the patient instruction tab for detailed discharge instructions and follow up information.       WALKER FOR HOME USE   Order Specific Question Answer Comments   Type of Walker: Adult (5'4"-6'6")    With wheels? Yes    Height: 5' 4" (1.626 m)    Weight: 71.8 kg (158 lb 4.6 oz)    Length of need (1-99 months): 99    Does patient have medical equipment at home? none    Please check all that apply: Patient's condition impairs ambulation.    Please check all that apply: Patient needs help to get in and out of chair.    Please check all that apply: Altered sensory perception.    Please check all that apply: Patient is unable to safely ambulate without " equipment.    Please check all that apply: Walker will be used for gait training.    Vendor: [facility]    Expected Date of Delivery: 6/15/2018      Ambulatory referral to Home Health   Referral Priority: Routine Referral Type: Home Health   Referral Reason: Specialty Services Required    Requested Specialty: Home Health Services    Number of Visits Requested: 1      Activity as tolerated     Notify your health care provider if you experience any of the following:  temperature >100.4     Notify your health care provider if you experience any of the following:  persistent nausea and vomiting or diarrhea     Notify your health care provider if you experience any of the following:  severe uncontrolled pain     Notify your health care provider if you experience any of the following:  redness, tenderness, or signs of infection (pain, swelling, redness, odor or green/yellow discharge around incision site)     Notify your health care provider if you experience any of the following:  difficulty breathing or increased cough     Notify your health care provider if you experience any of the following:  severe persistent headache     Notify your health care provider if you experience any of the following:  worsening rash     Notify your health care provider if you experience any of the following:  persistent dizziness, light-headedness, or visual disturbances     Notify your health care provider if you experience any of the following:  increased confusion or weakness     No dressing needed       Medications:  Reconciled Home Medications:      Medication List      START taking these medications    hydrocortisone 10 MG Tab  Commonly known as:  CORTEF  Take 20 mg (2 tablets) PO qAM and 10 mg (1 tablet) PO qPM.     oxyCODONE-acetaminophen 5-325 mg per tablet  Commonly known as:  PERCOCET  Take 1 tablet by mouth every 4 to 6 hours as needed for Pain.     sodium chloride 1 gram tablet  Take 1 tablet (1 g total) by mouth 3 (three)  times daily.        CHANGE how you take these medications    amLODIPine 10 MG tablet  Commonly known as:  NORVASC  Take 1 tablet (10 mg total) by mouth every evening.  What changed:  · medication strength  · how much to take     biotin 5,000 mcg Subl  Place 5,000 mcg under the tongue once daily.  What changed:  how much to take        CONTINUE taking these medications    DULoxetine 30 MG capsule  Commonly known as:  CYMBALTA  Take 30 mg by mouth once daily.     lisinopril 20 MG tablet  Commonly known as:  PRINIVIL,ZESTRIL  Take 20 mg by mouth once daily.     omeprazole 20 MG capsule  Commonly known as:  PRILOSEC  Take 20 mg by mouth once daily.        STOP taking these medications    ASPIR-81 ORAL            SAMMIE Castro  Neurosurgery  [facility]

## 2018-06-15 NOTE — PROGRESS NOTES
IV dcd intact no redness swelling or drainage noted, d/c instructions given . Daughter verbalized understanding. Tele dcd , redrawn H/H stable, incision to head with sutures intact , no redness swelling or drainage from incision.pt voiding without difficulty and tolerating food, no distress

## 2018-06-15 NOTE — ASSESSMENT & PLAN NOTE
83F with likely pituitary adenoma now s/p crani for resection on 6/5.    -Patient neurologically stable on exam  -Continue Cortef 20 mg q AM and 10 mg q PM  -HTN: Continue adjusted regimen. FU with PCP after DC from Rehab for management.   -Hyponatremia: Na 136. Continue Na tabs 1 g TID.   -FU with Dr. Shaffer in 2 weeks for staple removal and path results  -DC home with  PT/OT/ST. Plans for in home assessment by [facility] next week for possible admission.   -Discharge instructions given verbally to patient and family. All of their questions were answered. They were encouraged to call the clinic with any questions or concerns prior to follow up appt.

## 2018-06-15 NOTE — PLAN OF CARE
Family requested new Rehab list. Family wanted to take patient home with home health but is now reconsidering Rehab. PT/OT rec is Rehab.  Patient daughter provided new Rehab list.

## 2018-06-15 NOTE — PLAN OF CARE
ALLYSON spoke with Fabiano at [facility] and they are able to admit from home. Patient's family would like her to discharge home with Home Health and on Monday, Fabiano with [facility] will do an assessment for possible admission to their facility from home. H&P, Facesheet, Therapy note and progress note sent via [employer] to [facility].

## 2018-06-15 NOTE — DISCHARGE INSTRUCTIONS
Patient Information  -No driving while taking narcotic pain medications  -Do not take any OTC products containing acetaminophen at the same time as you take your narcotic pain medication. Medications that may contain acetaminophen include but are not limited to: Excedrin and other headache medications, arthritis medications, cold and sinus medications, etc. Please review the list of active ingredients in any OTC medication prior to taking it.  -Do not take any Aspirin or Aspirin containing products for 2 weeks after surgery  -Do not take any Aleeve, Naprosyn, Naproxen, Ibuprofen, Advil or any other NSAID for 2 weeks after surgery  -Do not consume any alcoholic beverages until released by your Neurosurgeon  -Do not perform any excessive bending over or leaning forward as this is a fall hazard.  -Do not perform any heavy lifting or lifting more than 10 lbs from the ground level as this is a fall hazard.    Contact the Neurosurgery Office immediately if:  -If you begin to notice any neurologic changes such as:           -Sudden onset of lethargy or sleepiness           -Sudden confusion, trouble speaking, or understanding            -Sudden trouble seeing in one or both eyes            -Sudden trouble walking, dizziness, loss of coordination            -Sudden severe headache with no known cause            -Sudden onset of numbness or weakness     Wound Care:  Keep your incision open to air. You may shower on Day 2. Have the stream of water hit you opposite from the incision. Do not scrub the incision. Pat the incision dry after your shower. You cannot take a bath/swim/submerge the incision until 8 weeks after surgery.    Apply Bacitracin ointment (over the counter) to incision twice daily.    Call your doctor or go to the Emergency Room for any signs of infection including: increased redness, drainage, pain or fever (temperature greater than or equal to 101.4).       Miscellaneous:  -Follow up with Dr. Shaffer  in 2  weeks for wound check and pathology results. Appointment will be mailed to you.  -Continue taking OTC salt tablets 1,000 mg (1g), 3x per day to increase your salt levels.   -Please follow up with your primary care physician in 1-2 weeks for monitoring of your blood pressure. Your home dose of Norvasc was increased while admitted. Please continue taking this new dose until you are evaluated by your PCP.       Neurosurgery Office: [phone]

## 2018-06-15 NOTE — PLAN OF CARE
FANY faxed HH orders through [facility] to [facility]. Yasmine at [facility] said they are accepting pt.    Yuliya Sinclair, [facility]  [phone]

## 2018-06-15 NOTE — SUBJECTIVE & OBJECTIVE
"Interval History:   NAEON. Exam attempted earlier but patient somnolent. Patient now fully awake and participating in exam. Denies any headaches, dizziness, N/V, worsening vision, or weakness. Tolerating diet. Voiding appropriately.     Medications:  Continuous Infusions:  Scheduled Meds:   amLODIPine  10 mg Oral QHS    bacitracin   Topical (Top) BID    DULoxetine  30 mg Oral Daily    heparin (porcine)  5,000 Units Subcutaneous Q8H    hydrocortisone  10 mg Oral QHS    hydrocortisone  20 mg Oral QAM    lisinopril  20 mg Oral Daily    pantoprazole  40 mg Oral Daily    sodium chloride  1 g Oral TID     PRN Meds:hydrALAZINE, labetalol, oxyCODONE-acetaminophen     Review of Systems  Objective:     Weight: 71.8 kg (158 lb 4.6 oz)  Body mass index is 27.17 kg/m².  Vital Signs (Most Recent):  Temp: 98.6 °F (37 °C) (06/15/18 1142)  Pulse: 84 (06/15/18 1142)  Resp: 18 (06/15/18 1142)  BP: (!) 151/82 (06/15/18 1142)  SpO2: (!) 92 % (06/15/18 1142) Vital Signs (24h Range):  Temp:  [96.7 °F (35.9 °C)-98.7 °F (37.1 °C)] 98.6 °F (37 °C)  Pulse:  [74-95] 84  Resp:  [16-18] 18  SpO2:  [92 %-97 %] 92 %  BP: (109-151)/(56-82) 151/82                           Urethral Catheter 06/12/18 1031 (Active)   Site Assessment Intact;Clean;Dry 6/13/2018  7:15 PM   Collection Container Urimeter 6/13/2018  7:15 PM   Securement Method secured to top of thigh w/ adhesive device 6/13/2018  7:15 PM   Catheter Care Performed yes 6/13/2018  7:15 PM   Reason for Continuing Urinary Catheterization Critically ill in ICU requiring intensive monitoring 6/13/2018  7:15 PM   CAUTI Prevention Bundle StatLock in place w 1" slack;Intact seal between catheter & drainage tubing;Drainage bag off the floor;Green sheeting clip in use;No dependent loops or kinks;Drainage bag not overfilled (<2/3 full);Drainage bag below bladder 6/13/2018  3:01 PM   Output (mL) 175 mL 6/14/2018  1:00 PM       Neurosurgery Physical Exam   General: well developed, well nourished, " no distress.   Head: normocephalic  Neurologic: Alert and oriented. Thought content appropriate.  GCS: Motor: 6/Verbal: 5/Eyes: 4 GCS Total: 15  Mental Status: Awake, Alert, Oriented x 4  Language: No aphasia  Speech: No dysarthria  Cranial nerves: face symmetric, tongue midline, CN II-XII grossly intact.   Eyes: pupils equal, round, reactive to light with accomodation, EOMI. Decreased vision bilaterally, L>R.  Pulmonary: normal respirations, no signs of respiratory distress  Abdomen: soft, non-distended, not tender to palpation  Sensory: intact to light touch throughout  Motor Strength:Moves all extremities spontaneously with good tone.  Full strength upper and lower extremities. No abnormal movements seen.   Pronator Drift: no drift noted  Finger-to-nose: Intact bilaterally  Clonus: absent  Babinski: absent  Skin: Skin is warm, dry and intact.        Incision:  Clean, dry, staples intact. No surrounding erythema or edema. No drainage or TTP.       Significant Labs:    Recent Labs  Lab 06/13/18  2106 06/14/18  0550 06/15/18  0342   GLU  --  105 78    136 133*   K  --  3.1* 4.2   CL  --  97 96   CO2  --  30* 24   BUN  --  11 16   CREATININE  --  0.7 0.7   CALCIUM  --  9.1 8.8       Recent Labs  Lab 06/14/18  0550 06/15/18  0811   WBC 10.49 11.71   HGB 12.4 12.5   HCT 37.0 38.9    236

## 2018-06-15 NOTE — PT/OT/SLP PROGRESS
Physical Therapy Treatment    Patient Name:  Florence Everett   MRN:  [id number]    Recommendations:     Discharge Recommendations:  rehabilitation facility   Discharge Equipment Recommendations: walker, rolling, shower chair   Barriers to discharge: Decreased caregiver support    Assessment:     Florence Everett is a 83 y.o. female admitted with a medical diagnosis of Pituitary mass.  She presents with the following impairments/functional limitations:  weakness, impaired endurance, impaired self care skills, gait instability, impaired functional mobilty, decreased safety awareness, visual deficits, impaired cardiopulmonary response to activity. Pt continues to demonstrate good effort during therapy sessions, noted to appear less drowsy this visit. Pt with impaired dynamic standing balance and visual deficits, placing pt at risk for falls. Pt not yet at PLOF; would benefit from continued PT intervention to address listed deficits and maximize return to PLOF.     Rehab Prognosis:  good; patient would benefit from acute skilled PT services to address these deficits and reach maximum level of function.      Recent Surgery: Procedure(s) (LRB):  CRANIOTOMY, USING FRAMELESS STEREOTAXY (STEALTH) (Left) 9 Days Post-Op    Plan:     During this hospitalization, patient to be seen 4 x/week to address the above listed problems via gait training, therapeutic activities, therapeutic exercises, neuromuscular re-education  · Plan of Care Expires:  07/09/18   Plan of Care Reviewed with: patient, daughter    Subjective     Communicated with RN prior to session.  Patient found supine with daughter at bedside upon PT entry to room. Pt alert and agreeable to treatment.      Chief Complaint: impaired vision   Patient comments/goals: return home  Pain/Comfort:  · Pain Rating 1: 0/10  · Pain Rating Post-Intervention 1: 0/10    Patients cultural, spiritual, Jehovah's witness conflicts given the current situation: no conflicts    Objective:      Patient found with: telemetry, peripheral IV     General Precautions: Standard, fall   Orthopedic Precautions:N/A   Braces: N/A     Functional Mobility:       Bed Mobility    · Supine to sit: CGA with cueing for log rolling   · Sit to supine: N/T         Transfers · Sit <> Stand: CGA from EOB x 1 trial with RW; CGA from bedside chair x 1 trial with RW        Gait  · Distance: ~100 ft   · Assistance level: RW and min A for balance, cueing to address gait deficits, safety and RW management    · Gait Deviations: decreased step length, decreased stride length, decreased vandana, step-to gait pattern with improvement to step-through gait pattern with feedback          Therapeutic Activities and Exercises:  Therapeutic activities aimed to:  - increase pt's independence, safety, and efficiency with bed mobility and functional transfers. See above for assistance levels.   - improve functional BLE strength: therapist reinforced education on HEP with pt performing LAQ, marches and ankle pumps x 12 reps each   - improve standing balance: pt performed marching in place of BLEs in standing-- unable to perform without UE support, required 1 HHA and min A to maintain balance     Therapist facilitated progression of gait training to improve gait stability, endurance, and independence with functional ambulation. Pt demonstrated improvement in step-through gait pattern when feedback provided. Continues to require min A for direction and navigation of obstacles 2/2 visual deficits.     Patient left up in chair with all lines intact, call button in reach, RN notified and daughter present.    AM-PAC 6 CLICK MOBILITY  Turning over in bed (including adjusting bedclothes, sheets and blankets)?: 4  Sitting down on and standing up from a chair with arms (e.g., wheelchair, bedside commode, etc.): 3  Moving from lying on back to sitting on the side of the bed?: 3  Moving to and from a bed to a chair (including a wheelchair)?: 3  Need to  walk in hospital room?: 3  Climbing 3-5 steps with a railing?: 2  Total Score: 18     GOALS:    Physical Therapy Goals        Problem: Physical Therapy Goal    Goal Priority Disciplines Outcome Goal Variances Interventions   Physical Therapy Goal     PT/OT, PT Ongoing (interventions implemented as appropriate)     Description:  Goals to be met by: 18    Patient will increase functional independence with mobility by performin. Supine <> sit with [place]  2. Sit to stand transfer with Modified [place] with or without appropriate AD.   3. Gait  x 150 feet with Supervision with or without least restrictive AD.   4. Stand for 3 minutes with Supervision while performing dynamic balance activities to reduce fall risk   5. Lower extremity exercise program x20 reps per handout, with supervision                       Time Tracking:     PT Received On: 06/15/18  PT Start Time: 08     PT Stop Time: 0846  PT Total Time (min): 26 min     Billable Minutes: Gait Training 15 min and Therapeutic Activity 11 min    Treatment Type: Treatment  PT/PTA: PT     PTA Visit Number: 0     Chiquita Valencia PT, DPT   6/15/2018  Pager: [phone]

## 2018-06-21 ENCOUNTER — OFFICE VISIT (OUTPATIENT)
Dept: NEUROSURGERY | Facility: CLINIC | Age: 83
End: 2018-06-21
Payer: MEDICARE

## 2018-06-21 VITALS
SYSTOLIC BLOOD PRESSURE: 125 MMHG | DIASTOLIC BLOOD PRESSURE: 66 MMHG | BODY MASS INDEX: 24.87 KG/M2 | TEMPERATURE: 98 F | HEART RATE: 81 BPM | WEIGHT: 144.88 LBS

## 2018-06-21 DIAGNOSIS — D35.2 PITUITARY ADENOMA: Primary | ICD-10-CM

## 2018-06-21 PROCEDURE — 99024 POSTOP FOLLOW-UP VISIT: CPT | Mod: POP,,, | Performed by: NEUROLOGICAL SURGERY

## 2018-06-21 PROCEDURE — 99999 PR PBB SHADOW E&M-EST. PATIENT-LVL III: CPT | Mod: PBBFAC,,, | Performed by: NEUROLOGICAL SURGERY

## 2018-06-21 PROCEDURE — 99213 OFFICE O/P EST LOW 20 MIN: CPT | Mod: PBBFAC | Performed by: NEUROLOGICAL SURGERY

## 2018-06-21 NOTE — PT/OT/SLP DISCHARGE
Physical Therapy Discharge Summary    Name: Florence Everett  MRN: [id number]   Principal Problem: Pituitary mass     Patient Discharged from acute Physical Therapy on 6/15/18.  Please refer to prior PT noted date on 6/15/18 for functional status.     Assessment:     Patient has not met goals.    Objective:     GOALS:    Physical Therapy Goals     Not on file          Multidisciplinary Problems (Resolved)        Problem: Physical Therapy Goal    Goal Priority Disciplines Outcome Goal Variances Interventions   Physical Therapy Goal   (Resolved)     PT/OT, PT Outcome(s) achieved     Description:  Goals to be met by: 18    Patient will increase functional independence with mobility by performin. Supine <> sit with [place]  2. Sit to stand transfer with Modified [place] with or without appropriate AD.   3. Gait  x 150 feet with Supervision with or without least restrictive AD.   4. Stand for 3 minutes with Supervision while performing dynamic balance activities to reduce fall risk   5. Lower extremity exercise program x20 reps per handout, with supervision                       Reasons for Discontinuation of Therapy Services  Transfer to alternate level of care.      Plan:     Patient Discharged to: Home with Home Health Service; PT recommending inpatient rehabilitation.    Chiquita Valencia, PT  2018

## 2018-07-30 ENCOUNTER — PATIENT MESSAGE (OUTPATIENT)
Dept: ADMINISTRATIVE | Facility: OTHER | Age: 83
End: 2018-07-30

## 2018-08-14 ENCOUNTER — TELEPHONE (OUTPATIENT)
Dept: NEUROSURGERY | Facility: CLINIC | Age: 83
End: 2018-08-14

## 2018-08-14 NOTE — TELEPHONE ENCOUNTER
FANY PICKARD PT IS SEEING A LOCAL NEURO-OPTHALMOLOGIST AND NEEDS HER RECORDS AND SCANS SENT TO THEM. SHARED THE NUMBERS TO CALL AND THE BEST WAY TO FACILITATE THAT REQUEST.

## 2018-08-14 NOTE — TELEPHONE ENCOUNTER
----- Message from Randolph Gongora sent at 8/14/2018 12:44 PM CDT -----  Contact: Radha (Daughter) [phone]  Needs Advice    Reason for call:  Radha called to request a copy of the patient's scans. Please contact the patient's daughter  to discuss further.      Communication Preference:PHONE     Additional Information:

## 2018-08-20 ENCOUNTER — TELEPHONE (OUTPATIENT)
Dept: NEUROSURGERY | Facility: CLINIC | Age: 83
End: 2018-08-20

## 2018-08-20 NOTE — TELEPHONE ENCOUNTER
----- Message from Jas Vergara sent at 8/20/2018 11:51 AM CDT -----  Needs Advice    Reason for call: Radha is asking orders for pre MRI labs need to be faxed to Dr. Bonifacio Lazaro ([facility]) at . Radha states Dr. Lazaro will fax the results back to Dr. Shaffer.  Communication Preference: Radha (daughter) @ [phone]  Additional Information:

## 2018-09-10 ENCOUNTER — OFFICE VISIT (OUTPATIENT)
Dept: NEUROSURGERY | Facility: CLINIC | Age: 83
End: 2018-09-10
Payer: MEDICARE

## 2018-09-10 ENCOUNTER — HOSPITAL ENCOUNTER (OUTPATIENT)
Dept: RADIOLOGY | Facility: HOSPITAL | Age: 83
Discharge: HOME | End: 2018-09-10
Attending: NEUROLOGICAL SURGERY
Payer: MEDICARE

## 2018-09-10 VITALS
TEMPERATURE: 98 F | DIASTOLIC BLOOD PRESSURE: 75 MMHG | BODY MASS INDEX: 27.45 KG/M2 | WEIGHT: 159.88 LBS | SYSTOLIC BLOOD PRESSURE: 116 MMHG | HEART RATE: 92 BPM

## 2018-09-10 DIAGNOSIS — D35.2 PITUITARY ADENOMA: ICD-10-CM

## 2018-09-10 DIAGNOSIS — D35.2 PITUITARY ADENOMA: Primary | ICD-10-CM

## 2018-09-10 LAB
CREAT SERPL-MCNC: 0.8 MG/DL (ref 0.5–1.4)
SAMPLE: NORMAL

## 2018-09-10 PROCEDURE — 99999 PR PBB SHADOW E&M-EST. PATIENT-LVL III: CPT | Mod: PBBFAC,,, | Performed by: NEUROLOGICAL SURGERY

## 2018-09-10 PROCEDURE — 25500020 PHARM REV CODE 255: Performed by: NEUROLOGICAL SURGERY

## 2018-09-10 PROCEDURE — 99024 POSTOP FOLLOW-UP VISIT: CPT | Mod: POP,,, | Performed by: NEUROLOGICAL SURGERY

## 2018-09-10 PROCEDURE — 70553 MRI BRAIN STEM W/O & W/DYE: CPT | Mod: 26,,, | Performed by: RADIOLOGY

## 2018-09-10 PROCEDURE — A9585 GADOBUTROL INJECTION: HCPCS | Performed by: NEUROLOGICAL SURGERY

## 2018-09-10 PROCEDURE — 99213 OFFICE O/P EST LOW 20 MIN: CPT | Mod: PBBFAC,25 | Performed by: NEUROLOGICAL SURGERY

## 2018-09-10 PROCEDURE — 70553 MRI BRAIN STEM W/O & W/DYE: CPT | Mod: TC

## 2018-09-10 RX ORDER — AMLODIPINE BESYLATE 10 MG/1
5 TABLET ORAL
COMMUNITY
Start: 2018-07-06

## 2018-09-10 RX ORDER — AMOXICILLIN 500 MG
CAPSULE ORAL
COMMUNITY

## 2018-09-10 RX ORDER — GADOBUTROL 604.72 MG/ML
4 INJECTION INTRAVENOUS
Status: COMPLETED | OUTPATIENT
Start: 2018-09-10 | End: 2018-09-10

## 2018-09-10 RX ORDER — OMEPRAZOLE 20 MG/1
CAPSULE, DELAYED RELEASE ORAL
COMMUNITY
Start: 2017-12-27

## 2018-09-10 RX ORDER — MULTIVIT WITH MINERALS/HERBS
1 TABLET ORAL DAILY
COMMUNITY

## 2018-09-10 RX ORDER — LISINOPRIL 20 MG/1
20 TABLET ORAL
COMMUNITY

## 2018-09-10 RX ORDER — HYDROCORTISONE 10 MG/1
10 TABLET ORAL
COMMUNITY

## 2018-09-10 RX ORDER — DULOXETIN HYDROCHLORIDE 30 MG/1
30 CAPSULE, DELAYED RELEASE ORAL
COMMUNITY

## 2018-09-10 RX ADMIN — GADOBUTROL 4 ML: 604.72 INJECTION INTRAVENOUS at 12:09

## 2018-09-10 NOTE — PROGRESS NOTES
This office note has been dictated.  Florence Everett  returned in neurosurgical followup to the office today.  She   has been seen in followup by [facility]mology [facility]ology in [place].    Her endocrine status seems to be stable.  She is requiring no replacement of   thyroid, cortisone or other pituitary hormones.  Her vision remains impaired.    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  She can read a large headline, but not a book.    She says if she comes up close to the television that she is able to make out   some details.  She has also noted some swelling of the lower extremities.    On examination today, she is alert and provides a good history.  Extraocular   movements are intact and pupils equal.  She was able to count fingers in the   nasal field of the left eye.  She sees better in the nasal field of the right   eye.  Her incision is well healed.  She shows no focal neurological deficit.    Both legs are somewhat edematous.  She is not particularly tender to palpation   over the calves.    MRI of the brain and pituitary were repeated at [facility] today and   compared to her preoperative scan of 06/05/18.  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  The pituitary gland and stalk are visualized.  There is a small   nodule of tissue underneath the left optic nerve going toward the cavernous   sinus.  Overall, the study is satisfactory and will serve as a baseline for us.    She will follow up with her endocrinologist and ophthalmologist.  The etiology   of her leg swelling is unclear.  I believe ultrasound to evaluate for venous   thrombosis would be helpful.  I would like to see her back in about three months   in reevaluation and we will plan for followup MRI next year.      CHEIKH/IN  dd: 09/10/2018 14:39:08 (CDT)  td: 09/11/2018 01:04:32 (CDT)  Doc ID   #[id number]  Job ID #[id number]    CC: Florence Everett

## (undated) DEVICE — STAPLER SKIN PROXIMATE WIDE

## (undated) DEVICE — SEE MEDLINE ITEM 154981

## (undated) DEVICE — SPRAY MASTISOL

## (undated) DEVICE — SEE MEDLINE ITEM 156905

## (undated) DEVICE — DRESSING TELFA STRL 4X3 LF

## (undated) DEVICE — SET SONAPET TUBING W/EXT

## (undated) DEVICE — CARTRIDGE OIL

## (undated) DEVICE — TAPE SURG MEDIPORE 6X72IN

## (undated) DEVICE — TIP ASPIRATOR STRAIGHT MICRO D

## (undated) DEVICE — SUT VICRYL PLUS 3-0 SH 18IN

## (undated) DEVICE — MARKERS SPHERZ PASSIVE

## (undated) DEVICE — WARMER DRAPE STERILE LF

## (undated) DEVICE — DRAPE THYROID WITH ARMBOARD

## (undated) DEVICE — DRESSING SURGICAL 1/2X1/2

## (undated) DEVICE — CORD BIPOLAR 12 FOOT

## (undated) DEVICE — RUBBERBAND STERILE 3X1/8IN

## (undated) DEVICE — SPONGE NEURO 1/4X1/4

## (undated) DEVICE — Device

## (undated) DEVICE — KIT EVACUATOR 3-SPRING 1/8 DRN

## (undated) DEVICE — HOOK STAY ELAS 5MM 8EA/PK

## (undated) DEVICE — SPONGE GAUZE 16PLY 4X4

## (undated) DEVICE — STOCKINET 4INX48

## (undated) DEVICE — BUR BONE CUT MICRO TPS 3X3.8MM

## (undated) DEVICE — DRESSING SURGICAL 1X3

## (undated) DEVICE — KIT SURGIFLO EVITHROM

## (undated) DEVICE — ROUTER TAPERED 2.3MM

## (undated) DEVICE — DRAPE OPMI STERILE

## (undated) DEVICE — DRESSING SURGICAL 1X1

## (undated) DEVICE — DIFFUSER

## (undated) DEVICE — CONTAINER SPECIMEN STRL 4OZ

## (undated) DEVICE — SUT 4/0 18IN NUROLON BLK B

## (undated) DEVICE — SEE MEDLINE ITEM 152622

## (undated) DEVICE — TRAY FOLEY 16FR INFECTION CONT

## (undated) DEVICE — BLADE SURG CARBON STEEL SZ11

## (undated) DEVICE — MARKER SKIN STND TIP BLUE BARR

## (undated) DEVICE — HEMOSTAT SURGICEL 4X8IN

## (undated) DEVICE — TUBE FRAZIER 5MM 2FT SOFT TIP

## (undated) DEVICE — ELECTRODE REM PLYHSV RETURN 9